# Patient Record
Sex: FEMALE | Race: WHITE | Employment: UNEMPLOYED | URBAN - METROPOLITAN AREA
[De-identification: names, ages, dates, MRNs, and addresses within clinical notes are randomized per-mention and may not be internally consistent; named-entity substitution may affect disease eponyms.]

---

## 2017-02-06 PROBLEM — F51.01 PRIMARY INSOMNIA: Status: ACTIVE | Noted: 2017-02-06

## 2017-02-06 PROBLEM — J30.1 SEASONAL ALLERGIC RHINITIS DUE TO POLLEN: Status: ACTIVE | Noted: 2017-02-06

## 2017-02-06 PROBLEM — M51.36 DDD (DEGENERATIVE DISC DISEASE), LUMBAR: Status: ACTIVE | Noted: 2017-02-06

## 2017-02-06 PROBLEM — G89.29 CHRONIC MIDLINE LOW BACK PAIN WITHOUT SCIATICA: Status: ACTIVE | Noted: 2017-02-06

## 2017-02-06 PROBLEM — G43.519 INTRACTABLE PERSISTENT MIGRAINE AURA WITHOUT CEREBRAL INFARCTION AND WITHOUT STATUS MIGRAINOSUS: Status: ACTIVE | Noted: 2017-02-06

## 2017-02-06 PROBLEM — K75.81 NASH (NONALCOHOLIC STEATOHEPATITIS): Status: ACTIVE | Noted: 2017-02-06

## 2017-02-06 PROBLEM — I10 ESSENTIAL HYPERTENSION: Status: ACTIVE | Noted: 2017-02-06

## 2017-02-06 PROBLEM — K21.9 GASTROESOPHAGEAL REFLUX DISEASE WITHOUT ESOPHAGITIS: Status: ACTIVE | Noted: 2017-02-06

## 2017-02-06 PROBLEM — K74.60 LIVER CIRRHOSIS SECONDARY TO NASH (NONALCOHOLIC STEATOHEPATITIS) (HCC): Status: ACTIVE | Noted: 2017-02-06

## 2017-02-06 PROBLEM — K75.81 LIVER CIRRHOSIS SECONDARY TO NASH (NONALCOHOLIC STEATOHEPATITIS) (HCC): Status: ACTIVE | Noted: 2017-02-06

## 2017-02-06 PROBLEM — J45.20 MILD INTERMITTENT ASTHMA WITHOUT COMPLICATION: Status: ACTIVE | Noted: 2017-02-06

## 2017-02-06 PROBLEM — M54.50 CHRONIC MIDLINE LOW BACK PAIN WITHOUT SCIATICA: Status: ACTIVE | Noted: 2017-02-06

## 2017-02-06 PROBLEM — K50.80 CROHN'S DISEASE OF BOTH SMALL AND LARGE INTESTINE WITHOUT COMPLICATION (HCC): Status: ACTIVE | Noted: 2017-02-06

## 2017-02-06 PROBLEM — F33.41 RECURRENT MAJOR DEPRESSIVE DISORDER, IN PARTIAL REMISSION (HCC): Status: ACTIVE | Noted: 2017-02-06

## 2017-02-06 PROBLEM — F41.9 ANXIETY: Status: ACTIVE | Noted: 2017-02-06

## 2018-10-22 PROBLEM — M54.41 CHRONIC MIDLINE LOW BACK PAIN WITH RIGHT-SIDED SCIATICA: Status: ACTIVE | Noted: 2017-02-06

## 2020-10-16 PROBLEM — Q52.4: Status: ACTIVE | Noted: 2017-03-27

## 2021-04-19 PROBLEM — M81.0 AGE-RELATED OSTEOPOROSIS WITHOUT CURRENT PATHOLOGICAL FRACTURE: Status: ACTIVE | Noted: 2021-04-19

## 2021-04-29 ENCOUNTER — TRANSCRIBE ORDER (OUTPATIENT)
Dept: SCHEDULING | Age: 61
End: 2021-04-29

## 2021-04-29 DIAGNOSIS — Z12.31 SCREENING MAMMOGRAM FOR HIGH-RISK PATIENT: Primary | ICD-10-CM

## 2021-11-11 PROBLEM — J01.00 ACUTE NON-RECURRENT MAXILLARY SINUSITIS: Status: ACTIVE | Noted: 2021-11-11

## 2021-11-11 PROBLEM — J01.00 ACUTE NON-RECURRENT MAXILLARY SINUSITIS: Status: RESOLVED | Noted: 2021-11-11 | Resolved: 2021-11-11

## 2021-12-10 PROBLEM — R25.1 TREMORS OF NERVOUS SYSTEM: Status: ACTIVE | Noted: 2021-12-10

## 2022-03-18 PROBLEM — K21.9 GASTROESOPHAGEAL REFLUX DISEASE WITHOUT ESOPHAGITIS: Status: ACTIVE | Noted: 2017-02-06

## 2022-03-18 PROBLEM — M81.0 AGE-RELATED OSTEOPOROSIS WITHOUT CURRENT PATHOLOGICAL FRACTURE: Status: ACTIVE | Noted: 2021-04-19

## 2022-03-18 PROBLEM — J45.20 MILD INTERMITTENT ASTHMA WITHOUT COMPLICATION: Status: ACTIVE | Noted: 2017-02-06

## 2022-03-18 PROBLEM — Q52.4: Status: ACTIVE | Noted: 2017-03-27

## 2022-03-19 PROBLEM — F41.9 ANXIETY: Status: ACTIVE | Noted: 2017-02-06

## 2022-03-19 PROBLEM — I10 ESSENTIAL HYPERTENSION: Status: ACTIVE | Noted: 2017-02-06

## 2022-03-19 PROBLEM — K74.60 LIVER CIRRHOSIS SECONDARY TO NASH (NONALCOHOLIC STEATOHEPATITIS) (HCC): Status: ACTIVE | Noted: 2017-02-06

## 2022-03-19 PROBLEM — K50.80 CROHN'S DISEASE OF BOTH SMALL AND LARGE INTESTINE WITHOUT COMPLICATION (HCC): Status: ACTIVE | Noted: 2017-02-06

## 2022-03-19 PROBLEM — K75.81 LIVER CIRRHOSIS SECONDARY TO NASH (NONALCOHOLIC STEATOHEPATITIS) (HCC): Status: ACTIVE | Noted: 2017-02-06

## 2022-03-19 PROBLEM — F51.01 PRIMARY INSOMNIA: Status: ACTIVE | Noted: 2017-02-06

## 2022-03-19 PROBLEM — M54.41 CHRONIC MIDLINE LOW BACK PAIN WITH RIGHT-SIDED SCIATICA: Status: ACTIVE | Noted: 2017-02-06

## 2022-03-19 PROBLEM — F33.41 RECURRENT MAJOR DEPRESSIVE DISORDER, IN PARTIAL REMISSION (HCC): Status: ACTIVE | Noted: 2017-02-06

## 2022-03-19 PROBLEM — G89.29 CHRONIC MIDLINE LOW BACK PAIN WITH RIGHT-SIDED SCIATICA: Status: ACTIVE | Noted: 2017-02-06

## 2022-03-19 PROBLEM — G43.519 INTRACTABLE PERSISTENT MIGRAINE AURA WITHOUT CEREBRAL INFARCTION AND WITHOUT STATUS MIGRAINOSUS: Status: ACTIVE | Noted: 2017-02-06

## 2022-03-19 PROBLEM — R25.1 TREMORS OF NERVOUS SYSTEM: Status: ACTIVE | Noted: 2021-12-10

## 2022-03-20 PROBLEM — M51.36 DDD (DEGENERATIVE DISC DISEASE), LUMBAR: Status: ACTIVE | Noted: 2017-02-06

## 2022-03-20 PROBLEM — J30.1 SEASONAL ALLERGIC RHINITIS DUE TO POLLEN: Status: ACTIVE | Noted: 2017-02-06

## 2022-05-16 PROBLEM — Z91.81 AT HIGH RISK FOR FALLS: Status: ACTIVE | Noted: 2022-05-16

## 2022-06-30 RX ORDER — POTASSIUM CHLORIDE 1500 MG/1
TABLET, FILM COATED, EXTENDED RELEASE ORAL
Qty: 90 TABLET | Refills: 1 | OUTPATIENT
Start: 2022-06-30

## 2022-07-01 ENCOUNTER — TELEMEDICINE (OUTPATIENT)
Dept: INTERNAL MEDICINE CLINIC | Facility: CLINIC | Age: 62
End: 2022-07-01
Payer: COMMERCIAL

## 2022-07-01 DIAGNOSIS — K21.9 GASTROESOPHAGEAL REFLUX DISEASE WITHOUT ESOPHAGITIS: ICD-10-CM

## 2022-07-01 DIAGNOSIS — G43.519 INTRACTABLE PERSISTENT MIGRAINE AURA WITHOUT CEREBRAL INFARCTION AND WITHOUT STATUS MIGRAINOSUS: ICD-10-CM

## 2022-07-01 DIAGNOSIS — F41.9 ANXIETY: ICD-10-CM

## 2022-07-01 DIAGNOSIS — M51.36 DDD (DEGENERATIVE DISC DISEASE), LUMBAR: Primary | ICD-10-CM

## 2022-07-01 DIAGNOSIS — F51.01 PRIMARY INSOMNIA: ICD-10-CM

## 2022-07-01 DIAGNOSIS — J00 ACUTE NASOPHARYNGITIS: ICD-10-CM

## 2022-07-01 PROCEDURE — 99214 OFFICE O/P EST MOD 30 MIN: CPT | Performed by: INTERNAL MEDICINE

## 2022-07-01 RX ORDER — SUMATRIPTAN 100 MG/1
100 TABLET, FILM COATED ORAL
Qty: 27 TABLET | Refills: 1 | Status: SHIPPED | OUTPATIENT
Start: 2022-07-01 | End: 2022-10-28 | Stop reason: SDUPTHER

## 2022-07-01 RX ORDER — ONDANSETRON 8 MG/1
8 TABLET, ORALLY DISINTEGRATING ORAL EVERY 8 HOURS PRN
Qty: 90 TABLET | Refills: 2 | Status: SHIPPED | OUTPATIENT
Start: 2022-07-01

## 2022-07-01 RX ORDER — AZITHROMYCIN 250 MG/1
250 TABLET, FILM COATED ORAL SEE ADMIN INSTRUCTIONS
Qty: 6 TABLET | Refills: 0 | Status: SHIPPED | OUTPATIENT
Start: 2022-07-01 | End: 2022-07-06

## 2022-07-01 RX ORDER — FLUCONAZOLE 150 MG/1
150 TABLET ORAL ONCE
Qty: 1 TABLET | Refills: 1 | Status: SHIPPED | OUTPATIENT
Start: 2022-07-01 | End: 2022-07-01

## 2022-07-01 RX ORDER — ALPRAZOLAM 2 MG/1
2 TABLET ORAL 3 TIMES DAILY PRN
Qty: 90 TABLET | Refills: 2 | Status: SHIPPED | OUTPATIENT
Start: 2022-07-01 | End: 2022-07-31

## 2022-07-01 RX ORDER — FLUOXETINE HYDROCHLORIDE 40 MG/1
40 CAPSULE ORAL 2 TIMES DAILY
Qty: 180 CAPSULE | Refills: 1 | Status: SHIPPED | OUTPATIENT
Start: 2022-07-01

## 2022-07-01 RX ORDER — POTASSIUM CHLORIDE 20 MEQ/1
20 TABLET, EXTENDED RELEASE ORAL DAILY
Qty: 90 TABLET | Refills: 1 | Status: SHIPPED | OUTPATIENT
Start: 2022-07-01 | End: 2022-09-09 | Stop reason: SDUPTHER

## 2022-07-01 RX ORDER — TRAZODONE HYDROCHLORIDE 50 MG/1
100 TABLET ORAL NIGHTLY
Qty: 60 TABLET | Refills: 5 | Status: SHIPPED | OUTPATIENT
Start: 2022-07-01

## 2022-07-01 RX ORDER — CELECOXIB 200 MG/1
200 CAPSULE ORAL DAILY
Qty: 90 CAPSULE | Refills: 1 | Status: SHIPPED | OUTPATIENT
Start: 2022-07-01

## 2022-07-01 RX ORDER — PREGABALIN 150 MG/1
150 CAPSULE ORAL 2 TIMES DAILY
Qty: 60 CAPSULE | Refills: 2 | Status: SHIPPED | OUTPATIENT
Start: 2022-07-01

## 2022-07-01 ASSESSMENT — ENCOUNTER SYMPTOMS: RESPIRATORY NEGATIVE: 1

## 2022-07-01 NOTE — PROGRESS NOTES
[facility]      [facility]    Patient Name: Noah Buchanan  :  1960    Subjective:    Chief Complaint:  Chief Complaint   Patient presents with    Follow-up         HPI I was at home while conducting this encounter. Consent:  She and/or her healthcare decision maker is aware that this patient-initiated Telehealth encounter is a billable service, with coverage as determined by her insurance carrier. She is aware that she may receive a bill and has provided verbal consent to proceed: Yes    This virtual visit was conducted via [address] Pursuant to the emergency declaration under the [facility], [facility] waiver authority and the [employer] and [employer] Act, this Virtual  Visit was conducted to reduce the patient's risk of exposure to COVID-19 and provide continuity of care for an established patient. Services were provided through a video synchronous discussion virtually to substitute for in-person clinic visit. Due to this being a TeleHealth evaluation, many elements of the physical examination are unable to be assessed. Total Time: minutes: 21-30 minutes. Patient evaluated for f/u; she has history of lumbar DDD, chronic back pain; she had spinal injection 4 days ago at Dr. Khurram Mora office, Pain Management, with minimal relief; she is also prescribed Oxycodone and muscle relaxants; she is no longer taking Fentanyl; she has f/u next week; She has anxiety and insomnia, feeling well on present medication and needs refills;    She is concerned she may have another sinus infection, has had more sinus congestion, drainage;     Past Medical History:   Diagnosis Date    Anxiety     [facility] disease ([facility] [address]     Depression     H/O seasonal allergies     History of bone density study 2018    osteoporosis    History of mammogram 2018    HTN (hypertension)     Insomnia     Liver cirrhosis secondary to CASTELLANOS (nonalcoholic steatohepatitis) [facility])        Past Surgical History:   Procedure Laterality Date    APPENDECTOMY      BACK SURGERY      CHOLECYSTECTOMY      COLONOSCOPY  2017    Dr. Lee William      DEEP AND BSO (CERVIX REMOVED)      TUBAL LIGATION         Family History   Problem Relation Age of Onset    Breast Cancer Mother        Social History     Tobacco Use    Smoking status: Former Smoker     Packs/day: 2.00     Quit date: 1997     Years since quittin.5    Smokeless tobacco: Never Used   Substance Use Topics    Alcohol use: No    Drug use: No                 Current Outpatient Medications:     ALPRAZolam (XANAX) 2 MG tablet, Take 1 tablet by mouth 3 times daily as needed for Anxiety for up to 30 days. , Disp: 90 tablet, Rfl: 2    traZODone (DESYREL) 50 MG tablet, Take 2 tablets by mouth nightly, Disp: 60 tablet, Rfl: 5    ondansetron (ZOFRAN-ODT) 8 MG TBDP disintegrating tablet, Place 1 tablet under the tongue every 8 hours as needed for Nausea or Vomiting TAKE 1 TAB BY MOUTH EVERY EIGHT (8) HOURS AS NEEDED FOR NAUSEA, Disp: 90 tablet, Rfl: 2    celecoxib (CELEBREX) 200 MG capsule, Take 1 capsule by mouth daily TAKE 1 CAP BY MOUTH DAILY FOR 90 DAYS., Disp: 90 capsule, Rfl: 1    pregabalin (LYRICA) 150 MG capsule, Take 1 capsule by mouth 2 times daily for 30 days. , Disp: 60 capsule, Rfl: 2    potassium chloride (KLOR-CON M) 20 MEQ extended release tablet, Take 1 tablet by mouth daily TAKE 1 TABLET (20 MEQ TOTAL) BY MOUTH DAILY. , Disp: 90 tablet, Rfl: 1    FLUoxetine (PROZAC) 40 MG capsule, Take 1 capsule by mouth 2 times daily, Disp: 180 capsule, Rfl: 1    azithromycin (ZITHROMAX) 250 MG tablet, Take 1 tablet by mouth See Admin Instructions for 5 days 500mg on day 1 followed by 250mg on days 2 - 5, Disp: 6 tablet, Rfl: 0    fluconazole (DIFLUCAN) 150 MG tablet, Take 1 tablet by mouth once for 1 dose, Disp: 1 tablet, Rfl: 1    SUMAtriptan (IMITREX) mouth daily. , Disp: , Rfl:     fluticasone (FLONASE) 50 MCG/ACT nasal spray, 1 spray by Each Nare route 2 (two) times a day., Disp: , Rfl:     ipratropium-albuterol (DUONEB) 0.5-2.5 (3) MG/3ML SOLN nebulizer solution, Take 3 mL by nebulization every 6 (six) hours as needed for wheezing., Disp: , Rfl:     levocetirizine (XYZAL) 5 MG tablet, Take 5 mg by mouth daily as needed, Disp: , Rfl:     lidocaine (XYLOCAINE) 5 % ointment, APPLY 2-3 GRAMS TO AFFECTED AREA(S) 3-4 TIMES DAILY. (1 GRAM = 1 DIME SIZE AMOUNT) AS NEEDED, Disp: , Rfl:     loratadine (CLARITIN) 10 MG tablet, TAKE 1 TABLET BY MOUTH EVERY DAY, Disp: , Rfl:     montelukast (SINGULAIR) 10 MG tablet, TAKE 1 TABLET BY MOUTH EVERY DAY, Disp: , Rfl:     ofloxacin (FLOXIN) 0.3 % otic solution, Place 5 drops in ear(s) 2 times daily, Disp: , Rfl:     oxyCODONE HCl (OXY-IR) 10 MG immediate release tablet, TAKE 1 TAB BY MOUTH EVERY 6 HOURS, Disp: , Rfl:     oxyCODONE-acetaminophen (PERCOCET) 5-325 MG per tablet, TAKE 1 TO 2 TABLETS BY MOUTH EVERY 8 HOURS AS NEEDED FOR PAIN, Disp: , Rfl:     pantoprazole (PROTONIX) 40 MG tablet, Take 40 mg by mouth 2 times daily, Disp: , Rfl:     propranolol (INDERAL) 10 MG tablet, TAKE 1 TAB BY MOUTH THREE (3) TIMES DAILY FOR 90 DAYS., Disp: , Rfl:     brompheniramine-pseudoephedrine-DM 2-30-10 MG/5ML syrup, Take 5 mLs by mouth 4 times daily as needed, Disp: , Rfl:     triamcinolone acetonide (KENALOG) 0.1 % paste, Apply 0.25 inches to teeth 2 (two) times a day., Disp: , Rfl:     Allergies   Allergen Reactions    Latex Other (See Comments)     02 TUBING TUBING FROM PAIN PUMP    Penicillin G Hives, Other (See Comments) and Rash     WHEEZING    Codeine Itching and Rash     Other reaction(s): Abdominal pain-Intolerance  Due to nausea, has taken since then & did fine, no side effects    Morphine Itching    Adhesive Tape Hives     ADHESIVE TAPE  BLISTERS  ADHESIVE TAPE  BLISTERS      Hydrocortisone Other (See Comments) See Admin Instructions for 5 days 500mg on day 1 followed by 250mg on days 2 - 5  -     fluconazole (DIFLUCAN) 150 MG tablet; Take 1 tablet by mouth once for 1 dose    Intractable persistent migraine aura without cerebral infarction and without status migrainosus  Stable on present medications, continue as prescribed      -     SUMAtriptan (IMITREX) 100 MG tablet; Take 1 tablet by mouth once as needed for Migraine          Follow-up and Dispositions    · Return in about 6 months (around 1/1/2023), or if symptoms worsen or fail to improve, for f/u w/ labs. Medication Reconciliation:  Current Medications Verified: Current medications/ immunizations were reviewed, including purpose, with patient. Family History, Social History, Current and Past Medical History was reviewed with patient and updated at today's office visit. Medication Reconciliation list was given to patient/ family. Patient was advised to discard old medication lists and provide all providers with current list at each visit and carry list with them in case of emergency.     Completed By:   Katie Rogers MD    [facility]  [address] [place] [address], [facility] [place]  [place], [address] [place] [address] Rd  [phone]  [phone] fax  2:35 PM

## 2022-07-11 DIAGNOSIS — Z76.0 ENCOUNTER FOR ISSUE OF REPEAT PRESCRIPTION: ICD-10-CM

## 2022-07-11 RX ORDER — PROPRANOLOL HYDROCHLORIDE 10 MG/1
TABLET ORAL
Qty: 270 TABLET | Refills: 1 | OUTPATIENT
Start: 2022-07-11

## 2022-07-15 DIAGNOSIS — F33.41 RECURRENT MAJOR DEPRESSIVE DISORDER, IN PARTIAL REMISSION (HCC): Primary | ICD-10-CM

## 2022-07-15 DIAGNOSIS — F51.01 PRIMARY INSOMNIA: ICD-10-CM

## 2022-07-15 DIAGNOSIS — K50.80 CROHN'S DISEASE OF BOTH SMALL AND LARGE INTESTINE WITHOUT COMPLICATION (HCC): ICD-10-CM

## 2022-07-15 RX ORDER — PROPRANOLOL HYDROCHLORIDE 10 MG/1
TABLET ORAL
Qty: 90 TABLET | Status: CANCELLED | OUTPATIENT
Start: 2022-07-15

## 2022-07-15 RX ORDER — TRAZODONE HYDROCHLORIDE 50 MG/1
100 TABLET ORAL NIGHTLY
Qty: 60 TABLET | Refills: 5 | Status: CANCELLED | OUTPATIENT
Start: 2022-07-15 | End: 2022-08-14

## 2022-07-15 RX ORDER — DICYCLOMINE HYDROCHLORIDE 10 MG/1
10 CAPSULE ORAL 2 TIMES DAILY
Qty: 120 CAPSULE | Status: CANCELLED | OUTPATIENT
Start: 2022-07-15

## 2022-07-15 RX ORDER — AZATHIOPRINE 50 MG/1
TABLET ORAL
Qty: 30 TABLET | OUTPATIENT
Start: 2022-07-15

## 2022-07-15 RX ORDER — DULOXETIN HYDROCHLORIDE 20 MG/1
20 CAPSULE, DELAYED RELEASE ORAL DAILY
Qty: 30 CAPSULE | Refills: 2 | Status: CANCELLED | OUTPATIENT
Start: 2022-07-15 | End: 2022-10-13

## 2022-07-21 ENCOUNTER — COMMUNITY OUTREACH (OUTPATIENT)
Dept: INTERNAL MEDICINE CLINIC | Facility: CLINIC | Age: 62
End: 2022-07-21

## 2022-07-21 NOTE — PROGRESS NOTES
Patient's HM shows they are overdue for Colonoscopy. [employer] and  files searched with  success.   Results attached to order and HM updated

## 2022-07-25 ENCOUNTER — COMMUNITY OUTREACH (OUTPATIENT)
Dept: INTERNAL MEDICINE CLINIC | Facility: CLINIC | Age: 62
End: 2022-07-25

## 2022-07-25 NOTE — PROGRESS NOTES
Patient's HM shows they are overdue for mammogram.   [employer] and  files searched  without success.

## 2022-07-26 ENCOUNTER — TELEPHONE (OUTPATIENT)
Dept: INTERNAL MEDICINE CLINIC | Facility: CLINIC | Age: 62
End: 2022-07-26

## 2022-08-01 ENCOUNTER — OFFICE VISIT (OUTPATIENT)
Dept: NEUROSURGERY | Age: 62
End: 2022-08-01
Payer: COMMERCIAL

## 2022-08-01 VITALS
SYSTOLIC BLOOD PRESSURE: 117 MMHG | TEMPERATURE: 97.3 F | HEART RATE: 59 BPM | BODY MASS INDEX: 26.31 KG/M2 | HEIGHT: 62 IN | WEIGHT: 143 LBS | OXYGEN SATURATION: 99 % | DIASTOLIC BLOOD PRESSURE: 72 MMHG

## 2022-08-01 DIAGNOSIS — G89.4 CHRONIC PAIN SYNDROME: Primary | ICD-10-CM

## 2022-08-01 PROCEDURE — 99214 OFFICE O/P EST MOD 30 MIN: CPT | Performed by: NURSE PRACTITIONER

## 2022-08-01 ASSESSMENT — PATIENT HEALTH QUESTIONNAIRE - PHQ9
SUM OF ALL RESPONSES TO PHQ QUESTIONS 1-9: 0
SUM OF ALL RESPONSES TO PHQ9 QUESTIONS 1 & 2: 0
1. LITTLE INTEREST OR PLEASURE IN DOING THINGS: 0
SUM OF ALL RESPONSES TO PHQ QUESTIONS 1-9: 0
2. FEELING DOWN, DEPRESSED OR HOPELESS: 0
SUM OF ALL RESPONSES TO PHQ QUESTIONS 1-9: 0
1. LITTLE INTEREST OR PLEASURE IN DOING THINGS: 0
SUM OF ALL RESPONSES TO PHQ QUESTIONS 1-9: 0
SUM OF ALL RESPONSES TO PHQ9 QUESTIONS 1 & 2: 0
SUM OF ALL RESPONSES TO PHQ QUESTIONS 1-9: 0
2. FEELING DOWN, DEPRESSED OR HOPELESS: 0

## 2022-08-01 NOTE — PROGRESS NOTES
[facility] NOTE:   History of Present Illness:    CC: [facility] follow up    Abril Salomon is a 63 y.o. female here to follow-up after having a right L4-5 selective nerve root block with Dr. German Villalba. Patient states the selective nerve root block did not alleviate any of her pain but in fact intensified it. Patient states that she is in unbearable pain and that the medication that she has been given is not enough. Patient states that she is interested in getting restarted on the fentanyl patches that she was once being prescribed. Past Medical History:   Diagnosis Date    Anxiety     Crohn's disease ([facility] [address]     Depression     H/O seasonal allergies     History of bone density study 03/2018    osteoporosis    History of mammogram 03/2018    HTN (hypertension)     Insomnia     Liver cirrhosis secondary to CASTELLANOS (nonalcoholic steatohepatitis) ([facility] [address]       Past Surgical History:   Procedure Laterality Date    APPENDECTOMY      BACK SURGERY  2004    CHOLECYSTECTOMY      COLONOSCOPY  01/2017    Dr. Osmany Taylro  2012    DEEP AND BSO (CERVIX REMOVED)      TUBAL LIGATION       Allergies   Allergen Reactions    Latex Other (See Comments)     02 TUBING TUBING FROM PAIN PUMP    Penicillin G Hives, Other (See Comments) and Rash     WHEEZING    Codeine Itching and Rash     Other reaction(s): Abdominal pain-Intolerance  Due to nausea, has taken since then & did fine, no side effects    Morphine Itching    Adhesive Tape Hives     ADHESIVE TAPE  BLISTERS        Hydrocortisone Other (See Comments)     IV infusion infiltrated and caused blisters.       Family History   Problem Relation Age of Onset    Breast Cancer Mother       Social History     Socioeconomic History    Marital status:      Spouse name: Not on file    Number of children: Not on file    Years of education: Not on file    Highest education level: Not on file   Occupational History    Not on file   Tobacco Use Smoking status: Former     Packs/day: 2.00     Types: Cigarettes     Quit date: 1997     Years since quittin.5    Smokeless tobacco: Never   Substance and Sexual Activity    Alcohol use: No    Drug use: No    Sexual activity: Not on file   Other Topics Concern    Not on file   Social History Narrative    Not on file     Social Determinants of Health     Financial Resource Strain: Not on file   Food Insecurity: Not on file   Transportation Needs: Not on file   Physical Activity: Not on file   Stress: Not on file   Social Connections: Not on file   Intimate Partner Violence: Not on file   Housing Stability: Not on file     Current Outpatient Medications   Medication Sig Dispense Refill    ondansetron (ZOFRAN-ODT) 8 MG TBDP disintegrating tablet Place 1 tablet under the tongue every 8 hours as needed for Nausea or Vomiting TAKE 1 TAB BY MOUTH EVERY EIGHT (8) HOURS AS NEEDED FOR NAUSEA 90 tablet 2    celecoxib (CELEBREX) 200 MG capsule Take 1 capsule by mouth daily TAKE 1 CAP BY MOUTH DAILY FOR 90 DAYS. 90 capsule 1    potassium chloride (KLOR-CON M) 20 MEQ extended release tablet Take 1 tablet by mouth daily TAKE 1 TABLET (20 MEQ TOTAL) BY MOUTH DAILY. 90 tablet 1    FLUoxetine (PROZAC) 40 MG capsule Take 1 capsule by mouth 2 times daily 180 capsule 1    FERROUS SULFATE PO Take 1 tablet by mouth daily. OMEGA-3 FATTY ACIDS-VITAMIN E PO Take by mouth      albuterol sulfate  (90 Base) MCG/ACT inhaler INHALE 2 PUFFS EVERY 6 (SIX) HOURS AS NEEDED FOR WHEEZING. ascorbic acid (VITAMIN C) 500 MG tablet Take 1 tablet (500 mg) by mouth daily. azaTHIOprine (IMURAN) 50 MG tablet TAKE 1 TABLET BY MOUTH 2 TIMES A DAY. Budesonide ER 9 MG TB24 TAKE 1 TABLET BY MOUTH ONCE DAILY.       buPROPion (WELLBUTRIN) 100 MG tablet Take 100 mg by mouth 2 times daily      calcipotriene (DOVONEX) 0.005 % cream APPLY 2-3 GRAMS TO AFFECTED AREA(S) 3-4 TIMES DAILY. (1 GRAM = 1 DIME SIZE AMOUNT) AS NEEDED Calcium-Vitamin D-Vitamin K 500-100-40 MG-UNT-MCG CHEW 500 mg.      vitamin D (CHOLECALCIFEROL) 125 MCG (5000 UT) CAPS capsule TAKE ONE CAPSULE BY MOUTH EVERY DAY      cyanocobalamin 1000 MCG/ML injection Inject 1 mL (1,000 mcg total) into the shoulder, thigh, or buttocks every 30 (thirty) days. Inject as directed: Dispense 1 multidose vial: Dispense injection syringes with medication. 12 each 21g needle with syringe. diclofenac sodium (VOLTAREN) 1 % GEL APPLY 3 4 GRAMS TO AFFECTED AREA THREE TIMES DAILY AS NEEDED      dicyclomine (BENTYL) 10 MG capsule Take 10 mg by mouth 2 times daily      diphenhydrAMINE (SOMINEX) 25 MG tablet Take 50 mg by mouth      doxycycline monohydrate (MONODOX) 100 MG capsule Take 100 mg by mouth 2 times daily      ergocalciferol (ERGOCALCIFEROL) 1.25 MG (22575 UT) capsule Take 1 capsule (50,000 Units total) by mouth daily. fluticasone (FLONASE) 50 MCG/ACT nasal spray 1 spray by Each Nare route 2 (two) times a day. ipratropium-albuterol (DUONEB) 0.5-2.5 (3) MG/3ML SOLN nebulizer solution Take 3 mL by nebulization every 6 (six) hours as needed for wheezing. levocetirizine (XYZAL) 5 MG tablet Take 5 mg by mouth daily as needed      lidocaine (XYLOCAINE) 5 % ointment APPLY 2-3 GRAMS TO AFFECTED AREA(S) 3-4 TIMES DAILY. (1 GRAM = 1 DIME SIZE AMOUNT) AS NEEDED      loratadine (CLARITIN) 10 MG tablet TAKE 1 TABLET BY MOUTH EVERY DAY      montelukast (SINGULAIR) 10 MG tablet TAKE 1 TABLET BY MOUTH EVERY DAY      ofloxacin (FLOXIN) 0.3 % otic solution Place 5 drops in ear(s) 2 times daily      oxyCODONE HCl (OXY-IR) 10 MG immediate release tablet TAKE 1 TAB BY MOUTH EVERY 6 HOURS      oxyCODONE-acetaminophen (PERCOCET) 5-325 MG per tablet TAKE 1 TO 2 TABLETS BY MOUTH EVERY 8 HOURS AS NEEDED FOR PAIN      pantoprazole (PROTONIX) 40 MG tablet Take 40 mg by mouth 2 times daily      propranolol (INDERAL) 10 MG tablet TAKE 1 TAB BY MOUTH THREE (3) TIMES DAILY FOR 90 DAYS. brompheniramine-pseudoephedrine-DM 2-30-10 MG/5ML syrup Take 5 mLs by mouth 4 times daily as needed      triamcinolone acetonide (KENALOG) 0.1 % paste Apply 0.25 inches to teeth 2 (two) times a day. traZODone (DESYREL) 50 MG tablet Take 2 tablets by mouth nightly 60 tablet 5    pregabalin (LYRICA) 150 MG capsule Take 1 capsule by mouth 2 times daily for 30 days. 60 capsule 2    SUMAtriptan (IMITREX) 100 MG tablet Take 1 tablet by mouth once as needed for Migraine 27 tablet 1    DULoxetine (CYMBALTA) 20 MG extended release capsule Take 20 mg by mouth daily       No current facility-administered medications for this visit. Patient Active Problem List   Diagnosis    Congenital anomaly of vagina    Dupuytren's disease of palm    Mild intermittent asthma without complication    Age-related osteoporosis without current pathological fracture    Gastroesophageal reflux disease without esophagitis    Vitamin D deficiency    Crohn's disease of both small and large intestine without complication (HCC)    Recurrent major depressive disorder, in partial remission (HCC)    Elbow pain    Tremors of nervous system    Intractable persistent migraine aura without cerebral infarction and without status migrainosus    Liver cirrhosis secondary to CASTELLANOS (nonalcoholic steatohepatitis) (HCC)    Essential hypertension    Anxiety    Primary insomnia    Chronic midline low back pain with right-sided sciatica    DDD (degenerative disc disease), lumbar    Seasonal allergic rhinitis due to pollen    At high risk for falls          ROS Review of Systems    Constitutional:                    No recent weight changes, fever, fatigue, sleep difficulties, loss of appetite   ENT/Mouth:  No hearing loss, No ringing in the ears, chronic sinus problem, nose bleeds,sore throat, voice change, hoarseness, swollen glands in neck, or difficulties with chewing and swallowing.    Cardiovascular:  No chest pain/angina pectoris, palpitations, swelling of feet/ankles/hands, or calf pain while walking. Respiratory: No chronic or frequent coughs, spitting up blood, shortness of breath, No asthma, or wheezing. Gastrointestinal: No a bdominal pain, heartburn, nausea, vomiting, constipation, or frequent diarrhea     Genitourinary: No frequent urination, burning or painful urination, or blood in urine     Musculoskeletal:   POS back pain     Integument:   No rash/itching     Neurological:   Dizziness/vertigo, No numbness/tingling sensation, tremors, No weakness in limbs, frequent or recurring headaches, memory loss or confusion. Physical Exam:    General: No acute distress  Head normocephalic and atraumatic  Mood and affect appropriate  CV: Regular rate   Resp: No increased work of breathing  Skin: warm and dry   Awake, alert, and oriented   Speech fluent  Eyes open spontaneously   Face symmetric and tongue midline on protrusion  Sternocleidomastoid and trapezius 5/5  No mid-line cervical, thoracic, or lumbar tenderness to palpation   Patient with strength exam as follows:   Upper Extremities: Right Left      Deltoid  5 5    Biceps  5 5    Triceps  5 5      5 5   Hand Intrinsics  5 5  Wrist flexors/extensors  5 5     Lower Extremities:      Hip Flex 5 5    Quads  5 5    Hamstrings 5 5    Dorsiflex 5 5    Plantarflex 5 5    EHL  5 5  Sensation intact to light touch and pin-prick   DTR 2+  No clonus or babinski present   No Quintero's sign present bilaterally   Gait normal    Assessment & Plan:  Calista Ford is a 63 y.o. female who presents to follow-up after having a right L4-5 selective nerve root block. At this time the patient states that she had no relief after her selective nerve root block but actually had worsening in pain. I am unable to find a structural origin to the patient's current pain. Patient is to follow back up with pain management to discuss other options. No diagnosis found.     Notes are transcribed with [employer], a medical voice

## 2022-08-03 DIAGNOSIS — F41.9 ANXIETY: ICD-10-CM

## 2022-08-03 DIAGNOSIS — F41.9 ANXIETY: Primary | ICD-10-CM

## 2022-08-03 RX ORDER — ESTRADIOL 0.1 MG/G
CREAM VAGINAL
COMMUNITY
Start: 2022-07-11

## 2022-08-03 RX ORDER — ALPRAZOLAM 2 MG/1
2 TABLET ORAL 3 TIMES DAILY PRN
Qty: 90 TABLET | Refills: 2 | Status: SHIPPED | OUTPATIENT
Start: 2022-08-03 | End: 2022-08-04 | Stop reason: SDUPTHER

## 2022-08-03 RX ORDER — ALPRAZOLAM 2 MG/1
2 TABLET ORAL 3 TIMES DAILY PRN
COMMUNITY
End: 2022-08-03 | Stop reason: SDUPTHER

## 2022-08-03 RX ORDER — METHOCARBAMOL 500 MG/1
TABLET, FILM COATED ORAL
COMMUNITY
Start: 2022-07-11

## 2022-08-03 NOTE — TELEPHONE ENCOUNTER
Dr. Chavez Oh pt requesting Xanax refills, please. Scripts last fill date: 7/11/22 for a 30 day supply. Pended for pick-up on 8/10/22.

## 2022-08-04 RX ORDER — ALPRAZOLAM 2 MG/1
2 TABLET ORAL 3 TIMES DAILY PRN
Qty: 90 TABLET | Refills: 2 | Status: SHIPPED | OUTPATIENT
Start: 2022-08-10 | End: 2022-10-05 | Stop reason: SDUPTHER

## 2022-08-08 NOTE — TELEPHONE ENCOUNTER
Dr. Sisi Flores pt requesting Xanax refills, please. Scripts last fill date: 7/11/22 for a 30 day supply. Pended for pick-up on 8/10/22.

## 2022-08-15 ENCOUNTER — OFFICE VISIT (OUTPATIENT)
Dept: INTERNAL MEDICINE CLINIC | Facility: CLINIC | Age: 62
End: 2022-08-15
Payer: COMMERCIAL

## 2022-08-15 VITALS
BODY MASS INDEX: 26.5 KG/M2 | SYSTOLIC BLOOD PRESSURE: 139 MMHG | WEIGHT: 144 LBS | RESPIRATION RATE: 16 BRPM | TEMPERATURE: 98.4 F | DIASTOLIC BLOOD PRESSURE: 47 MMHG | HEIGHT: 62 IN | HEART RATE: 67 BPM | OXYGEN SATURATION: 97 %

## 2022-08-15 DIAGNOSIS — J00 ACUTE NASOPHARYNGITIS: ICD-10-CM

## 2022-08-15 DIAGNOSIS — F41.9 ANXIETY: ICD-10-CM

## 2022-08-15 DIAGNOSIS — G89.29 CHRONIC MIDLINE LOW BACK PAIN WITH RIGHT-SIDED SCIATICA: Primary | ICD-10-CM

## 2022-08-15 DIAGNOSIS — M54.41 CHRONIC MIDLINE LOW BACK PAIN WITH RIGHT-SIDED SCIATICA: Primary | ICD-10-CM

## 2022-08-15 DIAGNOSIS — I10 ESSENTIAL HYPERTENSION: ICD-10-CM

## 2022-08-15 DIAGNOSIS — J30.1 SEASONAL ALLERGIC RHINITIS DUE TO POLLEN: ICD-10-CM

## 2022-08-15 DIAGNOSIS — E55.9 VITAMIN D DEFICIENCY: ICD-10-CM

## 2022-08-15 DIAGNOSIS — Z23 PNEUMOCOCCAL VACCINATION ADMINISTERED AT CURRENT VISIT: ICD-10-CM

## 2022-08-15 DIAGNOSIS — M51.36 DDD (DEGENERATIVE DISC DISEASE), LUMBAR: ICD-10-CM

## 2022-08-15 DIAGNOSIS — R79.9 ABNORMAL BLOOD CHEMISTRY: ICD-10-CM

## 2022-08-15 PROCEDURE — 90471 IMMUNIZATION ADMIN: CPT | Performed by: INTERNAL MEDICINE

## 2022-08-15 PROCEDURE — 96372 THER/PROPH/DIAG INJ SC/IM: CPT | Performed by: INTERNAL MEDICINE

## 2022-08-15 PROCEDURE — 99214 OFFICE O/P EST MOD 30 MIN: CPT | Performed by: INTERNAL MEDICINE

## 2022-08-15 PROCEDURE — 90677 PCV20 VACCINE IM: CPT | Performed by: INTERNAL MEDICINE

## 2022-08-15 RX ORDER — BETAMETHASONE SODIUM PHOSPHATE AND BETAMETHASONE ACETATE 3; 3 MG/ML; MG/ML
6 INJECTION, SUSPENSION INTRA-ARTICULAR; INTRALESIONAL; INTRAMUSCULAR; SOFT TISSUE ONCE
Status: COMPLETED | OUTPATIENT
Start: 2022-08-15 | End: 2022-08-15

## 2022-08-15 RX ORDER — DULOXETIN HYDROCHLORIDE 20 MG/1
20 CAPSULE, DELAYED RELEASE ORAL DAILY
Qty: 90 CAPSULE | Refills: 1 | Status: SHIPPED | OUTPATIENT
Start: 2022-08-15 | End: 2022-09-09 | Stop reason: SDUPTHER

## 2022-08-15 RX ORDER — LEVOFLOXACIN 500 MG/1
500 TABLET, FILM COATED ORAL DAILY
Qty: 7 TABLET | Refills: 0 | Status: SHIPPED | OUTPATIENT
Start: 2022-08-15 | End: 2022-08-22

## 2022-08-15 RX ADMIN — BETAMETHASONE SODIUM PHOSPHATE AND BETAMETHASONE ACETATE 6 MG: 3; 3 INJECTION, SUSPENSION INTRA-ARTICULAR; INTRALESIONAL; INTRAMUSCULAR; SOFT TISSUE at 17:36

## 2022-08-15 ASSESSMENT — PATIENT HEALTH QUESTIONNAIRE - PHQ9
SUM OF ALL RESPONSES TO PHQ QUESTIONS 1-9: 2
2. FEELING DOWN, DEPRESSED OR HOPELESS: 1
SUM OF ALL RESPONSES TO PHQ9 QUESTIONS 1 & 2: 2
SUM OF ALL RESPONSES TO PHQ QUESTIONS 1-9: 2
1. LITTLE INTEREST OR PLEASURE IN DOING THINGS: 1

## 2022-08-15 ASSESSMENT — ENCOUNTER SYMPTOMS
VOMITING: 0
BACK PAIN: 1
SHORTNESS OF BREATH: 0
COUGH: 0
NAUSEA: 0
CHEST TIGHTNESS: 0
BLOOD IN STOOL: 0
ABDOMINAL PAIN: 0
SINUS PRESSURE: 1
RHINORRHEA: 1
DIARRHEA: 0

## 2022-08-15 NOTE — PROGRESS NOTES
[facility]      8/15/2022    Patient Name: Trisha Narvaez  :  1960    Subjective:    Chief Complaint:  Chief Complaint   Patient presents with    Skin Problem     Pt c/o possible skin cancer on face. HPI she is having a hard time; she thinks her  is having an affair; she has chronic pain, has been going to Pain Management, but wondering if she can see someone in [place];   C/o crusting lesions on her forehead that come and go;   C/o sinus congestion, drainage; she denies fever, has had some chills; no known sick contacts; she has seasonal allergies; She has anxiety, taking Cymbalta as prescribed and feeling well, needs a refill as prescribed; She is requesting Journey perfect sleep chair, states Medicare will pay for it;     Past Medical History:   Diagnosis Date    Anxiety     Crohn's disease ([facility] [address]     Depression     H/O seasonal allergies     History of bone density study 2018    osteoporosis    History of mammogram 2018    HTN (hypertension)     Insomnia     Liver cirrhosis secondary to CASTELLANOS (nonalcoholic steatohepatitis) [facility])        Past Surgical History:   Procedure Laterality Date    APPENDECTOMY      BACK SURGERY      CHOLECYSTECTOMY      COLONOSCOPY  2017    Dr. Eugene Coley  2012    DEEP AND BSO (CERVIX REMOVED)      TUBAL LIGATION         Family History   Problem Relation Age of Onset    Breast Cancer Mother        Social History     Tobacco Use    Smoking status: Former     Packs/day: 2.00     Types: Cigarettes     Quit date: 1997     Years since quittin.6    Smokeless tobacco: Never   Substance Use Topics    Alcohol use: No    Drug use: No                 Current Outpatient Medications:     levoFLOXacin (LEVAQUIN) 500 MG tablet, Take 1 tablet by mouth in the morning for 7 days. , Disp: 7 tablet, Rfl: 0    DULoxetine (CYMBALTA) 20 MG extended release capsule, Take 1 capsule by mouth in the morning., Disp: 90 capsule, Rfl: 1    [facility]. Devices KIT, Journey perfect sleep chair, chronic back pain, Disp: 1 kit, Rfl: 0    ALPRAZolam (XANAX) 2 MG tablet, Take 1 tablet by mouth 3 times daily as needed for Anxiety for up to 90 days. , Disp: 90 tablet, Rfl: 2    methocarbamol (ROBAXIN) 500 MG tablet, TAKE 1/2 TO 1 TAB 3 TIMES DAILY AS NEEDED FOR PAIN, Disp: , Rfl:     estradiol (ESTRACE) 0.1 MG/GM vaginal cream, APPLY HALF A GRAM INTO THE VAGINA NIGHTLY FOR 2 WEEKS THEN TWICE WEEK, Disp: , Rfl:     traZODone (DESYREL) 50 MG tablet, Take 2 tablets by mouth nightly, Disp: 60 tablet, Rfl: 5    ondansetron (ZOFRAN-ODT) 8 MG TBDP disintegrating tablet, Place 1 tablet under the tongue every 8 hours as needed for Nausea or Vomiting TAKE 1 TAB BY MOUTH EVERY EIGHT (8) HOURS AS NEEDED FOR NAUSEA, Disp: 90 tablet, Rfl: 2    celecoxib (CELEBREX) 200 MG capsule, Take 1 capsule by mouth daily TAKE 1 CAP BY MOUTH DAILY FOR 90 DAYS., Disp: 90 capsule, Rfl: 1    pregabalin (LYRICA) 150 MG capsule, Take 1 capsule by mouth 2 times daily for 30 days. , Disp: 60 capsule, Rfl: 2    potassium chloride (KLOR-CON M) 20 MEQ extended release tablet, Take 1 tablet by mouth daily TAKE 1 TABLET (20 MEQ TOTAL) BY MOUTH DAILY. , Disp: 90 tablet, Rfl: 1    FLUoxetine (PROZAC) 40 MG capsule, Take 1 capsule by mouth 2 times daily, Disp: 180 capsule, Rfl: 1    SUMAtriptan (IMITREX) 100 MG tablet, Take 1 tablet by mouth once as needed for Migraine, Disp: 27 tablet, Rfl: 1    FERROUS SULFATE PO, Take 1 tablet by mouth daily. , Disp: , Rfl:     OMEGA-3 FATTY ACIDS-VITAMIN E PO, Take by mouth, Disp: , Rfl:     albuterol sulfate  (90 Base) MCG/ACT inhaler, INHALE 2 PUFFS EVERY 6 (SIX) HOURS AS NEEDED FOR WHEEZING., Disp: , Rfl:     ascorbic acid (VITAMIN C) 500 MG tablet, Take 1 tablet (500 mg) by mouth daily. , Disp: , Rfl:     azaTHIOprine (IMURAN) 50 MG tablet, TAKE 1 TABLET BY MOUTH 2 TIMES A DAY., Disp: , Rfl:     Budesonide ER 9 MG TB24, TAKE 1 TABLET BY MOUTH ONCE DAILY. , Disp: , Rfl:     buPROPion (WELLBUTRIN) 100 MG tablet, Take 100 mg by mouth 2 times daily, Disp: , Rfl:     calcipotriene (DOVONEX) 0.005 % cream, APPLY 2-3 GRAMS TO AFFECTED AREA(S) 3-4 TIMES DAILY. (1 GRAM = 1 DIME SIZE AMOUNT) AS NEEDED, Disp: , Rfl:     Calcium-Vitamin D-Vitamin K 500-100-40 MG-UNT-MCG CHEW, 500 mg., Disp: , Rfl:     vitamin D (CHOLECALCIFEROL) 125 MCG (5000 UT) CAPS capsule, TAKE ONE CAPSULE BY MOUTH EVERY DAY, Disp: , Rfl:     cyanocobalamin 1000 MCG/ML injection, Inject 1 mL (1,000 mcg total) into the shoulder, thigh, or buttocks every 30 (thirty) days. Inject as directed: Dispense 1 multidose vial: Dispense injection syringes with medication. 12 each 21g needle with syringe., Disp: , Rfl:     diclofenac sodium (VOLTAREN) 1 % GEL, APPLY 3 4 GRAMS TO AFFECTED AREA THREE TIMES DAILY AS NEEDED, Disp: , Rfl:     dicyclomine (BENTYL) 10 MG capsule, Take 10 mg by mouth 2 times daily, Disp: , Rfl:     diphenhydrAMINE (SOMINEX) 25 MG tablet, Take 50 mg by mouth, Disp: , Rfl:     doxycycline monohydrate (MONODOX) 100 MG capsule, Take 100 mg by mouth 2 times daily, Disp: , Rfl:     ergocalciferol (ERGOCALCIFEROL) 1.25 MG (35139 UT) capsule, Take 1 capsule (50,000 Units total) by mouth daily. , Disp: , Rfl:     fluticasone (FLONASE) 50 MCG/ACT nasal spray, 1 spray by Each Nare route 2 (two) times a day., Disp: , Rfl:     ipratropium-albuterol (DUONEB) 0.5-2.5 (3) MG/3ML SOLN nebulizer solution, Take 3 mL by nebulization every 6 (six) hours as needed for wheezing., Disp: , Rfl:     levocetirizine (XYZAL) 5 MG tablet, Take 5 mg by mouth daily as needed, Disp: , Rfl:     lidocaine (XYLOCAINE) 5 % ointment, APPLY 2-3 GRAMS TO AFFECTED AREA(S) 3-4 TIMES DAILY. (1 GRAM = 1 DIME SIZE AMOUNT) AS NEEDED, Disp: , Rfl:     loratadine (CLARITIN) 10 MG tablet, TAKE 1 TABLET BY MOUTH EVERY DAY, Disp: , Rfl:     montelukast (SINGULAIR) 10 MG tablet, TAKE 1 TABLET BY MOUTH EVERY DAY, Disp: , Rfl:     ofloxacin (FLOXIN) 0.3 % otic solution, Place 5 drops in ear(s) 2 times daily, Disp: , Rfl:     oxyCODONE HCl (OXY-IR) 10 MG immediate release tablet, TAKE 1 TAB BY MOUTH EVERY 6 HOURS, Disp: , Rfl:     oxyCODONE-acetaminophen (PERCOCET) 5-325 MG per tablet, TAKE 1 TO 2 TABLETS BY MOUTH EVERY 8 HOURS AS NEEDED FOR PAIN, Disp: , Rfl:     pantoprazole (PROTONIX) 40 MG tablet, Take 40 mg by mouth 2 times daily, Disp: , Rfl:     propranolol (INDERAL) 10 MG tablet, TAKE 1 TAB BY MOUTH THREE (3) TIMES DAILY FOR 90 DAYS., Disp: , Rfl:     brompheniramine-pseudoephedrine-DM 2-30-10 MG/5ML syrup, Take 5 mLs by mouth 4 times daily as needed, Disp: , Rfl:     triamcinolone acetonide (KENALOG) 0.1 % paste, Apply 0.25 inches to teeth 2 (two) times a day., Disp: , Rfl:     Allergies   Allergen Reactions    Latex Other (See Comments)     02 TUBING TUBING FROM PAIN PUMP    Penicillin G Hives, Other (See Comments) and Rash     WHEEZING    Codeine Itching and Rash     Other reaction(s): Abdominal pain-Intolerance  Due to nausea, has taken since then & did fine, no side effects    Morphine Itching    Adhesive Tape Hives     ADHESIVE TAPE  BLISTERS        Hydrocortisone Other (See Comments)     IV infusion infiltrated and caused blisters. Review of Systems   Constitutional:  Negative for chills, fatigue and fever. HENT:  Positive for congestion, rhinorrhea and sinus pressure. Negative for postnasal drip. Eyes:  Negative for visual disturbance. Respiratory:  Negative for cough, chest tightness and shortness of breath. Cardiovascular:  Negative for chest pain and palpitations. Gastrointestinal:  Negative for abdominal pain, blood in stool, diarrhea, nausea and vomiting. Genitourinary:  Negative for dysuria, frequency and urgency. Musculoskeletal:  Positive for back pain and myalgias. Negative for arthralgias. Skin: Negative. Neurological:  Negative for numbness and headaches.    Psychiatric/Behavioral:  Negative for dysphoric mood and sleep disturbance. The patient is not nervous/anxious. Objective:  BP (!) 139/47   Pulse 67   Temp 98.4 °F (36.9 °C) (Temporal)   Resp 16   Ht 5' 2\" (1.575 m)   Wt 144 lb (65.3 kg)   SpO2 97%   BMI 26.34 kg/m²     Examination:  Physical Exam  Constitutional:       Appearance: Normal appearance. HENT:      Head: Normocephalic and atraumatic. Right Ear: Tympanic membrane and ear canal normal.      Left Ear: Tympanic membrane and ear canal normal.      Nose: Nose normal.      Mouth/Throat:      Mouth: Mucous membranes are moist.      Pharynx: Posterior oropharyngeal erythema present. Eyes:      Extraocular Movements: Extraocular movements intact. Conjunctiva/sclera: Conjunctivae normal.      Pupils: Pupils are equal, round, and reactive to light. Cardiovascular:      Rate and Rhythm: Normal rate and regular rhythm. Pulses: Normal pulses. Heart sounds: Normal heart sounds. Pulmonary:      Effort: Pulmonary effort is normal.      Breath sounds: Normal breath sounds. Abdominal:      General: Abdomen is flat. Bowel sounds are normal.      Palpations: Abdomen is soft. Musculoskeletal:      Cervical back: Normal range of motion and neck supple. Skin:     General: Skin is warm and dry. Neurological:      General: No focal deficit present. Mental Status: She is alert. Mental status is at baseline. Psychiatric:         Mood and Affect: Mood normal.         Thought Content: Thought content normal.         Assessment/Plan:    Sekou Wolff was seen today for skin problem. Diagnoses and all orders for this visit:    Chronic midline low back pain with right-sided sciatica  -     External Referral To Pain Medicine  -     [facility] Devices [facility]; Journey perfect sleep chair, chronic back pain    DDD (degenerative disc disease), lumbar  -     External Referral To Pain Medicine  -     [facility]  Devices KIT; Journey perfect sleep chair, chronic back pain    Seasonal allergic rhinitis due to pollen  -     betamethasone acetate-betamethasone sodium phosphate (CELESTONE) injection 6 mg    Acute nasopharyngitis  Instructed to take medications as prescribed, and to call if no improvement in symptoms.     -     levoFLOXacin (LEVAQUIN) 500 MG tablet; Take 1 tablet by mouth in the morning for 7 days. -     betamethasone acetate-betamethasone sodium phosphate (CELESTONE) injection 6 mg    Anxiety  Stable on present medications, continue as prescribed    -     DULoxetine (CYMBALTA) 20 MG extended release capsule; Take 1 capsule by mouth in the morning. Pneumococcal vaccination administered at current visit  -     Pneumococcal, PCV20, PREVNAR 21, (age 25 yrs+), IM, PF        Follow-up and Dispositions    Return in about 3 months (around 11/15/2022), or if symptoms worsen or fail to improve, for f/u w/ labs. Medication Reconciliation:  Current Medications Verified: Current medications/ immunizations were reviewed, including purpose, with patient. Family History, Social History, Current and Past Medical History was reviewed with patient and updated at today's office visit. Medication Reconciliation list was given to patient/ family. Patient was advised to discard old medication lists and provide all providers with current list at each visit and carry list with them in case of emergency.     Completed By:   Jessica Raymond MD    [facility]  [address], [address], [address]  [phone]  [phone] fax  5:39 PM

## 2022-08-16 ENCOUNTER — TELEPHONE (OUTPATIENT)
Dept: INTERNAL MEDICINE CLINIC | Facility: CLINIC | Age: 62
End: 2022-08-16

## 2022-08-16 NOTE — TELEPHONE ENCOUNTER
Received a call from [facility] stating pt's Rx for Journey perfect sleep chair needs to go to [facility]. Thank you.

## 2022-08-17 ENCOUNTER — TELEPHONE (OUTPATIENT)
Dept: INTERNAL MEDICINE CLINIC | Facility: CLINIC | Age: 62
End: 2022-08-17

## 2022-08-17 DIAGNOSIS — M54.41 CHRONIC MIDLINE LOW BACK PAIN WITH RIGHT-SIDED SCIATICA: ICD-10-CM

## 2022-08-17 DIAGNOSIS — M51.36 DDD (DEGENERATIVE DISC DISEASE), LUMBAR: ICD-10-CM

## 2022-08-17 DIAGNOSIS — G89.29 CHRONIC MIDLINE LOW BACK PAIN WITH RIGHT-SIDED SCIATICA: ICD-10-CM

## 2022-08-17 NOTE — TELEPHONE ENCOUNTER
Received a call from [facility] stating pt's Rx for Journey perfect sleep chair needs to go to [facility]. Thank you.     Prescription sent to pharmacy, keep follow up appointment;

## 2022-08-24 ENCOUNTER — TELEMEDICINE (OUTPATIENT)
Dept: INTERNAL MEDICINE CLINIC | Facility: CLINIC | Age: 62
End: 2022-08-24
Payer: COMMERCIAL

## 2022-08-24 DIAGNOSIS — J01.00 ACUTE NON-RECURRENT MAXILLARY SINUSITIS: Primary | ICD-10-CM

## 2022-08-24 PROCEDURE — 99213 OFFICE O/P EST LOW 20 MIN: CPT | Performed by: NURSE PRACTITIONER

## 2022-08-24 RX ORDER — DOXYCYCLINE HYCLATE 100 MG
100 TABLET ORAL 2 TIMES DAILY
Qty: 20 TABLET | Refills: 0 | Status: SHIPPED | OUTPATIENT
Start: 2022-08-24 | End: 2022-09-03

## 2022-08-24 ASSESSMENT — ENCOUNTER SYMPTOMS
EYE DISCHARGE: 0
COUGH: 1
DIARRHEA: 0
SINUS PAIN: 1
COLOR CHANGE: 0
BACK PAIN: 0
ABDOMINAL PAIN: 0
SHORTNESS OF BREATH: 0
ABDOMINAL DISTENTION: 0
EYE REDNESS: 0
APNEA: 0
EYE ITCHING: 0
CONSTIPATION: 0
NAUSEA: 0
EYE PAIN: 0
SINUS PRESSURE: 1

## 2022-08-24 NOTE — PROGRESS NOTES
mouth 2 times daily 180 capsule 1    SUMAtriptan (IMITREX) 100 MG tablet Take 1 tablet by mouth once as needed for Migraine 27 tablet 1    FERROUS SULFATE PO Take 1 tablet by mouth daily. OMEGA-3 FATTY ACIDS-VITAMIN E PO Take by mouth      albuterol sulfate  (90 Base) MCG/ACT inhaler INHALE 2 PUFFS EVERY 6 (SIX) HOURS AS NEEDED FOR WHEEZING. ascorbic acid (VITAMIN C) 500 MG tablet Take 1 tablet (500 mg) by mouth daily. azaTHIOprine (IMURAN) 50 MG tablet TAKE 1 TABLET BY MOUTH 2 TIMES A DAY. Budesonide ER 9 MG TB24 TAKE 1 TABLET BY MOUTH ONCE DAILY. buPROPion (WELLBUTRIN) 100 MG tablet Take 100 mg by mouth 2 times daily      calcipotriene (DOVONEX) 0.005 % cream APPLY 2-3 GRAMS TO AFFECTED AREA(S) 3-4 TIMES DAILY. (1 GRAM = 1 DIME SIZE AMOUNT) AS NEEDED      Calcium-Vitamin D-Vitamin K 500-100-40 MG-UNT-MCG CHEW 500 mg.      vitamin D (CHOLECALCIFEROL) 125 MCG (5000 UT) CAPS capsule TAKE ONE CAPSULE BY MOUTH EVERY DAY      cyanocobalamin 1000 MCG/ML injection Inject 1 mL (1,000 mcg total) into the shoulder, thigh, or buttocks every 30 (thirty) days. Inject as directed: Dispense 1 multidose vial: Dispense injection syringes with medication. 12 each 21g needle with syringe. diclofenac sodium (VOLTAREN) 1 % GEL APPLY 3 4 GRAMS TO AFFECTED AREA THREE TIMES DAILY AS NEEDED      dicyclomine (BENTYL) 10 MG capsule Take 10 mg by mouth 2 times daily      diphenhydrAMINE (SOMINEX) 25 MG tablet Take 50 mg by mouth      doxycycline monohydrate (MONODOX) 100 MG capsule Take 100 mg by mouth 2 times daily      ergocalciferol (ERGOCALCIFEROL) 1.25 MG (24956 UT) capsule Take 1 capsule (50,000 Units total) by mouth daily. fluticasone (FLONASE) 50 MCG/ACT nasal spray 1 spray by Each Nare route 2 (two) times a day. ipratropium-albuterol (DUONEB) 0.5-2.5 (3) MG/3ML SOLN nebulizer solution Take 3 mL by nebulization every 6 (six) hours as needed for wheezing.       levocetirizine (XYZAL) 5 MG tablet Take 5 mg by mouth daily as needed      lidocaine (XYLOCAINE) 5 % ointment APPLY 2-3 GRAMS TO AFFECTED AREA(S) 3-4 TIMES DAILY. (1 GRAM = 1 DIME SIZE AMOUNT) AS NEEDED      loratadine (CLARITIN) 10 MG tablet TAKE 1 TABLET BY MOUTH EVERY DAY      montelukast (SINGULAIR) 10 MG tablet TAKE 1 TABLET BY MOUTH EVERY DAY      ofloxacin (FLOXIN) 0.3 % otic solution Place 5 drops in ear(s) 2 times daily      oxyCODONE HCl (OXY-IR) 10 MG immediate release tablet TAKE 1 TAB BY MOUTH EVERY 6 HOURS      oxyCODONE-acetaminophen (PERCOCET) 5-325 MG per tablet TAKE 1 TO 2 TABLETS BY MOUTH EVERY 8 HOURS AS NEEDED FOR PAIN      pantoprazole (PROTONIX) 40 MG tablet Take 40 mg by mouth 2 times daily      propranolol (INDERAL) 10 MG tablet TAKE 1 TAB BY MOUTH THREE (3) TIMES DAILY FOR 90 DAYS. brompheniramine-pseudoephedrine-DM 2-30-10 MG/5ML syrup Take 5 mLs by mouth 4 times daily as needed      triamcinolone acetonide (KENALOG) 0.1 % paste Apply 0.25 inches to teeth 2 (two) times a day. No current facility-administered medications for this visit. Allergies   Allergen Reactions    Latex Other (See Comments)     02 TUBING TUBING FROM PAIN PUMP    Penicillin G Hives, Other (See Comments) and Rash     WHEEZING    Codeine Itching and Rash     Other reaction(s): Abdominal pain-Intolerance  Due to nausea, has taken since then & did fine, no side effects    Morphine Itching    Adhesive Tape Hives     ADHESIVE TAPE  BLISTERS        Hydrocortisone Other (See Comments)     IV infusion infiltrated and caused blisters.        Past Medical History:   Diagnosis Date    Anxiety     Crohn's disease ([facility] [address]     Depression     H/O seasonal allergies     History of bone density study 03/2018    osteoporosis    History of mammogram 03/2018    HTN (hypertension)     Insomnia     Liver cirrhosis secondary to CASTELLANOS (nonalcoholic steatohepatitis) ([facility] [address]      Past Surgical History:   Procedure Laterality Date    APPENDECTOMY      BACK SURGERY 2004    CHOLECYSTECTOMY      COLONOSCOPY  2017    Dr. Janine Mccormick  2012    DEEP AND BSO (CERVIX REMOVED)      TUBAL LIGATION       Family History   Problem Relation Age of Onset    Breast Cancer Mother      Social History     Tobacco Use    Smoking status: Former     Packs/day: 2.00     Types: Cigarettes     Quit date: 1997     Years since quittin.6    Smokeless tobacco: Never   Substance Use Topics    Alcohol use: No       Review of Systems   Constitutional:  Positive for fatigue. Negative for activity change, appetite change, chills and fever. HENT:  Positive for congestion, sinus pressure and sinus pain. Negative for ear pain. Eyes:  Negative for pain, discharge, redness and itching. Respiratory:  Positive for cough. Negative for apnea and shortness of breath. Cardiovascular:  Negative for chest pain, palpitations and leg swelling. Gastrointestinal:  Negative for abdominal distention, abdominal pain, constipation, diarrhea and nausea. Endocrine: Negative for cold intolerance and heat intolerance. Genitourinary:  Negative for difficulty urinating, dysuria, enuresis and urgency. Musculoskeletal:  Negative for arthralgias, back pain, joint swelling, myalgias and neck pain. Skin:  Negative for color change and rash. Neurological:  Negative for dizziness, weakness and headaches. Psychiatric/Behavioral:  Negative for behavioral problems and sleep disturbance. The patient is not nervous/anxious. OBJECTIVE:  There were no vitals taken for this visit. Physical Exam  Constitutional:       General: She is not in acute distress. Appearance: Normal appearance. She is ill-appearing. She is not toxic-appearing. HENT:      Nose: Congestion present. Cardiovascular:      Rate and Rhythm: Normal rate. Pulmonary:      Effort: Pulmonary effort is normal. No respiratory distress. Skin:     General: Skin is warm and dry.    Neurological:      Mental Status: She is alert. Mental status is at baseline. Psychiatric:         Mood and Affect: Mood normal.         Behavior: Behavior normal.         Thought Content: Thought content normal.         ASSESSMENT and PLAN  Diagnoses and all orders for this visit:    Acute non-recurrent maxillary sinusitis  -     doxycycline hyclate (VIBRA-TABS) 100 MG tablet; Take 1 tablet by mouth 2 times daily for 10 days    Start on doxycycline. Offered to schedule time to come in and get covid tested but declined; advised to test OTC and notify if positive. Push fluids and rest. Return if symptoms worsen. Pursuant to the emergency declaration under the [facility], [facility] waiver authority and the [employer] and [employer] Act, this Virtual Visit was conducted, with patient's consent, to reduce the patient's risk of exposure to COVID-19 and provide continuity of care for an established patient. Services were provided through a video synchronous discussion virtually to substitute for in-person clinic visit. Patient consented to virtual visit. Greater than 50% of this 15 min visit was spent counseling the patient about test results, prognosis, importance of compliance, education about disease process, benefits of medications, instructions for management of acute symptoms, and follow up plans. Follow-up and Dispositions    Return if symptoms worsen or fail to improve.          Nahed Swenson NP, APRN - CNP

## 2022-09-03 DIAGNOSIS — F33.42 MAJOR DEPRESSIVE DISORDER, RECURRENT, IN FULL REMISSION (HCC): ICD-10-CM

## 2022-09-06 RX ORDER — POTASSIUM CHLORIDE 1500 MG/1
TABLET, FILM COATED, EXTENDED RELEASE ORAL
Qty: 90 TABLET | Refills: 1 | OUTPATIENT
Start: 2022-09-06

## 2022-09-06 RX ORDER — BUPROPION HYDROCHLORIDE 100 MG/1
TABLET ORAL
Qty: 180 TABLET | Refills: 1 | OUTPATIENT
Start: 2022-09-06

## 2022-09-06 RX ORDER — PROPRANOLOL HYDROCHLORIDE 10 MG/1
TABLET ORAL
Qty: 270 TABLET | Refills: 1 | OUTPATIENT
Start: 2022-09-06

## 2022-09-07 DIAGNOSIS — F33.41 RECURRENT MAJOR DEPRESSIVE DISORDER, IN PARTIAL REMISSION (HCC): Primary | ICD-10-CM

## 2022-09-07 DIAGNOSIS — F41.9 ANXIETY: ICD-10-CM

## 2022-09-07 DIAGNOSIS — K21.9 GASTROESOPHAGEAL REFLUX DISEASE WITHOUT ESOPHAGITIS: ICD-10-CM

## 2022-09-09 RX ORDER — DULOXETIN HYDROCHLORIDE 20 MG/1
20 CAPSULE, DELAYED RELEASE ORAL DAILY
Qty: 90 CAPSULE | Refills: 1 | Status: SHIPPED | OUTPATIENT
Start: 2022-09-09

## 2022-09-09 RX ORDER — PROPRANOLOL HYDROCHLORIDE 10 MG/1
TABLET ORAL
Qty: 270 TABLET | Refills: 1 | Status: SHIPPED | OUTPATIENT
Start: 2022-09-09

## 2022-09-09 RX ORDER — POTASSIUM CHLORIDE 20 MEQ/1
20 TABLET, EXTENDED RELEASE ORAL DAILY
Qty: 90 TABLET | Refills: 1 | Status: SHIPPED | OUTPATIENT
Start: 2022-09-09

## 2022-09-16 ENCOUNTER — TELEMEDICINE (OUTPATIENT)
Dept: INTERNAL MEDICINE CLINIC | Facility: CLINIC | Age: 62
End: 2022-09-16
Payer: COMMERCIAL

## 2022-09-16 ENCOUNTER — TELEPHONE (OUTPATIENT)
Dept: INTERNAL MEDICINE CLINIC | Facility: CLINIC | Age: 62
End: 2022-09-16

## 2022-09-16 DIAGNOSIS — Z91.81 HISTORY OF FALL: ICD-10-CM

## 2022-09-16 DIAGNOSIS — R30.0 DYSURIA: Primary | ICD-10-CM

## 2022-09-16 DIAGNOSIS — R25.1 TREMORS OF NERVOUS SYSTEM: ICD-10-CM

## 2022-09-16 PROCEDURE — 99213 OFFICE O/P EST LOW 20 MIN: CPT | Performed by: INTERNAL MEDICINE

## 2022-09-16 RX ORDER — CIPROFLOXACIN 250 MG/1
250 TABLET, FILM COATED ORAL 2 TIMES DAILY
Qty: 6 TABLET | Refills: 0 | Status: SHIPPED | OUTPATIENT
Start: 2022-09-16 | End: 2022-09-19

## 2022-09-16 ASSESSMENT — ENCOUNTER SYMPTOMS: RESPIRATORY NEGATIVE: 1

## 2022-09-16 NOTE — PROGRESS NOTES
[facility]      2022    Patient Name: Rc Hankins  :  1960    Subjective:    Chief Complaint:  Chief Complaint   Patient presents with    Lower Back Pain         HPI I was at home while conducting this encounter. Consent:  She and/or her healthcare decision maker is aware that this patient-initiated Telehealth encounter is a billable service, with coverage as determined by her insurance carrier. She is aware that she may receive a bill and has provided verbal consent to proceed: Yes    This virtual visit was conducted via [employer]. Pursuant to the emergency declaration under the [facility], [facility] waiver authority and the [employer] Act, this Virtual  Visit was conducted to reduce the patient's risk of exposure to COVID-19 and provide continuity of care for an established patient. Services were provided through a video synchronous discussion virtually to substitute for in-person clinic visit. Due to this being a TeleHealth evaluation, many elements of the physical examination are unable to be assessed. Total Time: minutes: 11-20 minutes. She had a fall 2 days ago; she grabbed on to the rail on her porch, fell on her bottom; she and  are going through a divorce; she is considering moving into her daughter's out building; c/o chills, bladder pressure; she denies fever; she has been going to Pain Management and is prescribed Oxycodone; she is not interested in using a cane because \"I trip over them\"; She is concerned about recurrent tremors, and would like to see a Neurologist again;      Past Medical History:   Diagnosis Date    Anxiety     Crohn's disease ([facility] [address]     Depression     H/O seasonal allergies     History of bone density study 2018    osteoporosis    History of mammogram 2018    HTN (hypertension)     Insomnia     Liver cirrhosis secondary to CASTELLANOS (nonalcoholic capsule by mouth daily TAKE 1 CAP BY MOUTH DAILY FOR 90 DAYS., Disp: 90 capsule, Rfl: 1    pregabalin (LYRICA) 150 MG capsule, Take 1 capsule by mouth 2 times daily for 30 days. , Disp: 60 capsule, Rfl: 2    FLUoxetine (PROZAC) 40 MG capsule, Take 1 capsule by mouth 2 times daily, Disp: 180 capsule, Rfl: 1    SUMAtriptan (IMITREX) 100 MG tablet, Take 1 tablet by mouth once as needed for Migraine, Disp: 27 tablet, Rfl: 1    FERROUS SULFATE PO, Take 1 tablet by mouth daily. , Disp: , Rfl:     OMEGA-3 FATTY ACIDS-VITAMIN E PO, Take by mouth, Disp: , Rfl:     albuterol sulfate  (90 Base) MCG/ACT inhaler, INHALE 2 PUFFS EVERY 6 (SIX) HOURS AS NEEDED FOR WHEEZING., Disp: , Rfl:     ascorbic acid (VITAMIN C) 500 MG tablet, Take 1 tablet (500 mg) by mouth daily. , Disp: , Rfl:     azaTHIOprine (IMURAN) 50 MG tablet, TAKE 1 TABLET BY MOUTH 2 TIMES A DAY., Disp: , Rfl:     Budesonide ER 9 MG TB24, TAKE 1 TABLET BY MOUTH ONCE DAILY. , Disp: , Rfl:     buPROPion (WELLBUTRIN) 100 MG tablet, Take 100 mg by mouth 2 times daily, Disp: , Rfl:     calcipotriene (DOVONEX) 0.005 % cream, APPLY 2-3 GRAMS TO AFFECTED AREA(S) 3-4 TIMES DAILY. (1 GRAM = 1 DIME SIZE AMOUNT) AS NEEDED, Disp: , Rfl:     Calcium-Vitamin D-Vitamin K 500-100-40 MG-UNT-MCG CHEW, 500 mg., Disp: , Rfl:     vitamin D (CHOLECALCIFEROL) 125 MCG (5000 UT) CAPS capsule, TAKE ONE CAPSULE BY MOUTH EVERY DAY, Disp: , Rfl:     cyanocobalamin 1000 MCG/ML injection, Inject 1 mL (1,000 mcg total) into the shoulder, thigh, or buttocks every 30 (thirty) days. Inject as directed: Dispense 1 multidose vial: Dispense injection syringes with medication.  12 each 21g needle with syringe., Disp: , Rfl:     diclofenac sodium (VOLTAREN) 1 % GEL, APPLY 3 4 GRAMS TO AFFECTED AREA THREE TIMES DAILY AS NEEDED, Disp: , Rfl:     dicyclomine (BENTYL) 10 MG capsule, Take 10 mg by mouth 2 times daily, Disp: , Rfl:     diphenhydrAMINE (SOMINEX) 25 MG tablet, Take 50 mg by mouth, Disp: , Rfl: doxycycline monohydrate (MONODOX) 100 MG capsule, Take 100 mg by mouth 2 times daily, Disp: , Rfl:     ergocalciferol (ERGOCALCIFEROL) 1.25 MG (37209 UT) capsule, Take 1 capsule (50,000 Units total) by mouth daily. , Disp: , Rfl:     fluticasone (FLONASE) 50 MCG/ACT nasal spray, 1 spray by Each Nare route 2 (two) times a day., Disp: , Rfl:     ipratropium-albuterol (DUONEB) 0.5-2.5 (3) MG/3ML SOLN nebulizer solution, Take 3 mL by nebulization every 6 (six) hours as needed for wheezing., Disp: , Rfl:     levocetirizine (XYZAL) 5 MG tablet, Take 5 mg by mouth daily as needed, Disp: , Rfl:     lidocaine (XYLOCAINE) 5 % ointment, APPLY 2-3 GRAMS TO AFFECTED AREA(S) 3-4 TIMES DAILY. (1 GRAM = 1 DIME SIZE AMOUNT) AS NEEDED, Disp: , Rfl:     loratadine (CLARITIN) 10 MG tablet, TAKE 1 TABLET BY MOUTH EVERY DAY, Disp: , Rfl:     montelukast (SINGULAIR) 10 MG tablet, TAKE 1 TABLET BY MOUTH EVERY DAY, Disp: , Rfl:     ofloxacin (FLOXIN) 0.3 % otic solution, Place 5 drops in ear(s) 2 times daily, Disp: , Rfl:     oxyCODONE HCl (OXY-IR) 10 MG immediate release tablet, TAKE 1 TAB BY MOUTH EVERY 6 HOURS, Disp: , Rfl:     oxyCODONE-acetaminophen (PERCOCET) 5-325 MG per tablet, TAKE 1 TO 2 TABLETS BY MOUTH EVERY 8 HOURS AS NEEDED FOR PAIN, Disp: , Rfl:     pantoprazole (PROTONIX) 40 MG tablet, Take 40 mg by mouth 2 times daily, Disp: , Rfl:     brompheniramine-pseudoephedrine-DM 2-30-10 MG/5ML syrup, Take 5 mLs by mouth 4 times daily as needed, Disp: , Rfl:     triamcinolone acetonide (KENALOG) 0.1 % paste, Apply 0.25 inches to teeth 2 (two) times a day., Disp: , Rfl:     Allergies   Allergen Reactions    Latex Other (See Comments)     02 TUBING TUBING FROM PAIN PUMP    Penicillin G Hives, Other (See Comments) and Rash     WHEEZING    Codeine Itching and Rash     Other reaction(s): Abdominal pain-Intolerance  Due to nausea, has taken since then & did fine, no side effects    Morphine Itching    Adhesive Tape Hives     ADHESIVE TAPE  BLISTERS        Hydrocortisone Other (See Comments)     IV infusion infiltrated and caused blisters. Review of Systems   Constitutional: Negative. HENT: Negative. Respiratory: Negative. Cardiovascular: Negative. Genitourinary:  Positive for dysuria. Neurological:  Positive for tremors. Objective: There were no vitals taken for this visit. Examination:  Physical Exam  Neurological:      Mental Status: She is alert. Psychiatric:         Mood and Affect: Mood normal.         Thought Content: Thought content normal.         Judgment: Judgment normal.         Assessment/Plan:    Heydi Irving was seen today for lower back pain. Diagnoses and all orders for this visit:    Dysuria  Instructed to take medications as prescribed, and to call if no improvement in symptoms. -     ciprofloxacin (CIPRO) 250 MG tablet; Take 1 tablet by mouth 2 times daily for 3 days    History of fall  -     External Referral To Neurology    Tremors of nervous system  -     External Referral To Neurology        Follow-up and Dispositions    Return if symptoms worsen or fail to improve. Medication Reconciliation:  Current Medications Verified: Current medications/ immunizations were reviewed, including purpose, with patient. Family History, Social History, Current and Past Medical History was reviewed with patient and updated at today's office visit. Medication Reconciliation list was given to patient/ family. Patient was advised to discard old medication lists and provide all providers with current list at each visit and carry list with them in case of emergency.     Completed By:   Irineo Alcantara MD    [facility] & [address], [address], [address]  [phone]  [phone] fax  5:44 PM

## 2022-09-21 ENCOUNTER — OFFICE VISIT (OUTPATIENT)
Dept: INTERNAL MEDICINE CLINIC | Facility: CLINIC | Age: 62
End: 2022-09-21
Payer: COMMERCIAL

## 2022-09-21 VITALS
HEIGHT: 62 IN | WEIGHT: 146.6 LBS | OXYGEN SATURATION: 96 % | DIASTOLIC BLOOD PRESSURE: 62 MMHG | TEMPERATURE: 98.2 F | SYSTOLIC BLOOD PRESSURE: 109 MMHG | HEART RATE: 69 BPM | RESPIRATION RATE: 16 BRPM | BODY MASS INDEX: 26.98 KG/M2

## 2022-09-21 DIAGNOSIS — Z74.09 MOBILITY IMPAIRED: ICD-10-CM

## 2022-09-21 DIAGNOSIS — M51.36 DDD (DEGENERATIVE DISC DISEASE), LUMBAR: ICD-10-CM

## 2022-09-21 DIAGNOSIS — R11.2 INTRACTABLE VOMITING WITH NAUSEA, UNSPECIFIED VOMITING TYPE: ICD-10-CM

## 2022-09-21 DIAGNOSIS — M81.0 AGE-RELATED OSTEOPOROSIS WITHOUT CURRENT PATHOLOGICAL FRACTURE: ICD-10-CM

## 2022-09-21 DIAGNOSIS — J02.9 SORE THROAT: ICD-10-CM

## 2022-09-21 DIAGNOSIS — G89.29 CHRONIC MIDLINE LOW BACK PAIN WITH RIGHT-SIDED SCIATICA: ICD-10-CM

## 2022-09-21 DIAGNOSIS — R19.7 DIARRHEA, UNSPECIFIED TYPE: ICD-10-CM

## 2022-09-21 DIAGNOSIS — Z76.89 ENCOUNTER FOR POWER MOBILITY DEVICE ASSESSMENT: Primary | ICD-10-CM

## 2022-09-21 DIAGNOSIS — M54.41 CHRONIC MIDLINE LOW BACK PAIN WITH RIGHT-SIDED SCIATICA: ICD-10-CM

## 2022-09-21 LAB
EXP DATE SOLUTION: NORMAL
EXP DATE SWAB: NORMAL
LOT NUMBER SOLUTION: NORMAL
LOT NUMBER SWAB: NORMAL
SARS-COV-2 RNA, POC: NEGATIVE

## 2022-09-21 PROCEDURE — 99214 OFFICE O/P EST MOD 30 MIN: CPT | Performed by: INTERNAL MEDICINE

## 2022-09-21 PROCEDURE — 87635 SARS-COV-2 COVID-19 AMP PRB: CPT | Performed by: INTERNAL MEDICINE

## 2022-09-21 ASSESSMENT — ENCOUNTER SYMPTOMS
ABDOMINAL PAIN: 0
SHORTNESS OF BREATH: 0
NAUSEA: 0
BACK PAIN: 1
CHEST TIGHTNESS: 0
SINUS PRESSURE: 0
RHINORRHEA: 0
COUGH: 0
DIARRHEA: 1
VOMITING: 0
BLOOD IN STOOL: 0

## 2022-09-21 NOTE — PROGRESS NOTES
[facility]      2022    Patient Name: Monet Kumari  :  1960    Subjective:    Chief Complaint:  Chief Complaint   Patient presents with    Other     Pt is here for evaluation for a lift chair. HPI Here for mobility evaluation; she has history of lumbar DDD, chronic low back pain with R sided sciatica, osteoporosis; she fell 3 times last night while packing, as she is planning on moving; she is moving to her daughter's out building; she and  are ; she does not use cane or walker to get around; she fell backwards, did not hit her head; she has had some diarrhea and vomiting since last night; she denies fevers, chills; no known sick contacts; she is requesting a lift chair and a power WC today; she does not have any paper work with her today, but will send up copies; she reports difficulty rising from seated position; she reports difficulty getting around her home from one room to another; she used a power wheel chair in the past, but \"it's getting old\" and no longer working; her current WC is over 11years old;     Past Medical History:   Diagnosis Date    Anxiety     Crohn's disease ([facility] [address]     Depression     H/O seasonal allergies     History of bone density study 2018    osteoporosis    History of mammogram 2018    HTN (hypertension)     Insomnia     Liver cirrhosis secondary to CASTELLANOS (nonalcoholic steatohepatitis) [facility])        Past Surgical History:   Procedure Laterality Date    APPENDECTOMY      BACK SURGERY      CHOLECYSTECTOMY      COLONOSCOPY  2017    Dr. Yasmine Mayorga  2012    DEEP AND BSO (CERVIX REMOVED)      TUBAL LIGATION         Family History   Problem Relation Age of Onset    Breast Cancer Mother        Social History     Tobacco Use    Smoking status: Former     Packs/day: 2.00     Types: Cigarettes     Quit date: 1997     Years since quittin.7    Smokeless tobacco: Never   Substance Use Topics    Alcohol use:  No Drug use: No                 Current Outpatient Medications:     propranolol (INDERAL) 10 MG tablet, TAKE 1 TAB BY MOUTH THREE (3) TIMES DAILY FOR 90 DAYS., Disp: 270 tablet, Rfl: 1    potassium chloride (KLOR-CON M) 20 MEQ extended release tablet, Take 1 tablet by mouth daily, Disp: 90 tablet, Rfl: 1    DULoxetine (CYMBALTA) 20 MG extended release capsule, Take 1 capsule by mouth daily, Disp: 90 capsule, Rfl: 1    Misc. Devices KIT, [place] perfect sleep chair, chronic back pain, Disp: 1 kit, Rfl: 0    ALPRAZolam (XANAX) 2 MG tablet, Take 1 tablet by mouth 3 times daily as needed for Anxiety for up to 90 days. , Disp: 90 tablet, Rfl: 2    methocarbamol (ROBAXIN) 500 MG tablet, TAKE 1/2 TO 1 TAB 3 TIMES DAILY AS NEEDED FOR PAIN, Disp: , Rfl:     estradiol (ESTRACE) 0.1 MG/GM vaginal cream, APPLY HALF A GRAM INTO THE VAGINA NIGHTLY FOR 2 WEEKS THEN TWICE WEEK, Disp: , Rfl:     traZODone (DESYREL) 50 MG tablet, Take 2 tablets by mouth nightly, Disp: 60 tablet, Rfl: 5    ondansetron (ZOFRAN-ODT) 8 MG TBDP disintegrating tablet, Place 1 tablet under the tongue every 8 hours as needed for Nausea or Vomiting TAKE 1 TAB BY MOUTH EVERY EIGHT (8) HOURS AS NEEDED FOR NAUSEA, Disp: 90 tablet, Rfl: 2    celecoxib (CELEBREX) 200 MG capsule, Take 1 capsule by mouth daily TAKE 1 CAP BY MOUTH DAILY FOR 90 DAYS., Disp: 90 capsule, Rfl: 1    pregabalin (LYRICA) 150 MG capsule, Take 1 capsule by mouth 2 times daily for 30 days. , Disp: 60 capsule, Rfl: 2    FLUoxetine (PROZAC) 40 MG capsule, Take 1 capsule by mouth 2 times daily, Disp: 180 capsule, Rfl: 1    SUMAtriptan (IMITREX) 100 MG tablet, Take 1 tablet by mouth once as needed for Migraine, Disp: 27 tablet, Rfl: 1    FERROUS SULFATE PO, Take 1 tablet by mouth daily. , Disp: , Rfl:     OMEGA-3 FATTY ACIDS-VITAMIN E PO, Take by mouth, Disp: , Rfl:     albuterol sulfate  (90 Base) MCG/ACT inhaler, INHALE 2 PUFFS EVERY 6 (SIX) HOURS AS NEEDED FOR WHEEZING., Disp: , Rfl: ascorbic acid (VITAMIN C) 500 MG tablet, Take 1 tablet (500 mg) by mouth daily. , Disp: , Rfl:     azaTHIOprine (IMURAN) 50 MG tablet, TAKE 1 TABLET BY MOUTH 2 TIMES A DAY., Disp: , Rfl:     Budesonide ER 9 MG TB24, TAKE 1 TABLET BY MOUTH ONCE DAILY. , Disp: , Rfl:     buPROPion (WELLBUTRIN) 100 MG tablet, Take 100 mg by mouth 2 times daily, Disp: , Rfl:     calcipotriene (DOVONEX) 0.005 % cream, APPLY 2-3 GRAMS TO AFFECTED AREA(S) 3-4 TIMES DAILY. (1 GRAM = 1 DIME SIZE AMOUNT) AS NEEDED, Disp: , Rfl:     Calcium-Vitamin D-Vitamin K 500-100-40 MG-UNT-MCG CHEW, 500 mg., Disp: , Rfl:     vitamin D (CHOLECALCIFEROL) 125 MCG (5000 UT) CAPS capsule, TAKE ONE CAPSULE BY MOUTH EVERY DAY, Disp: , Rfl:     cyanocobalamin 1000 MCG/ML injection, Inject 1 mL (1,000 mcg total) into the shoulder, thigh, or buttocks every 30 (thirty) days. Inject as directed: Dispense 1 multidose vial: Dispense injection syringes with medication. 12 each 21g needle with syringe., Disp: , Rfl:     diclofenac sodium (VOLTAREN) 1 % GEL, APPLY 3 4 GRAMS TO AFFECTED AREA THREE TIMES DAILY AS NEEDED, Disp: , Rfl:     dicyclomine (BENTYL) 10 MG capsule, Take 10 mg by mouth 2 times daily, Disp: , Rfl:     diphenhydrAMINE (SOMINEX) 25 MG tablet, Take 50 mg by mouth, Disp: , Rfl:     doxycycline monohydrate (MONODOX) 100 MG capsule, Take 100 mg by mouth 2 times daily, Disp: , Rfl:     ergocalciferol (ERGOCALCIFEROL) 1.25 MG (63863 UT) capsule, Take 1 capsule (50,000 Units total) by mouth daily. , Disp: , Rfl:     fluticasone (FLONASE) 50 MCG/ACT nasal spray, 1 spray by Each Nare route 2 (two) times a day., Disp: , Rfl:     ipratropium-albuterol (DUONEB) 0.5-2.5 (3) MG/3ML SOLN nebulizer solution, Take 3 mL by nebulization every 6 (six) hours as needed for wheezing., Disp: , Rfl:     levocetirizine (XYZAL) 5 MG tablet, Take 5 mg by mouth daily as needed, Disp: , Rfl:     lidocaine (XYLOCAINE) 5 % ointment, APPLY 2-3 GRAMS TO AFFECTED AREA(S) 3-4 TIMES DAILY.  (1 and myalgias. Skin: Negative. Neurological:  Positive for numbness. Negative for headaches. Psychiatric/Behavioral:  Negative for dysphoric mood and sleep disturbance. The patient is not nervous/anxious. Objective:  /62   Pulse 69   Temp 98.2 °F (36.8 °C) (Temporal)   Resp 16   Ht 5' 2\" (1.575 m)   Wt 146 lb 9.6 oz (66.5 kg)   SpO2 96%   BMI 26.81 kg/m²     Examination:  Physical Exam  Constitutional:       Appearance: Normal appearance. HENT:      Head: Normocephalic and atraumatic. Right Ear: Tympanic membrane and ear canal normal.      Left Ear: Tympanic membrane and ear canal normal.      Nose: Nose normal.      Mouth/Throat:      Mouth: Mucous membranes are moist.   Eyes:      Extraocular Movements: Extraocular movements intact. Conjunctiva/sclera: Conjunctivae normal.      Pupils: Pupils are equal, round, and reactive to light. Cardiovascular:      Rate and Rhythm: Normal rate and regular rhythm. Pulses: Normal pulses. Heart sounds: Normal heart sounds. Pulmonary:      Effort: Pulmonary effort is normal.      Breath sounds: Normal breath sounds. Abdominal:      General: Abdomen is flat. Bowel sounds are normal.      Palpations: Abdomen is soft. Musculoskeletal:         General: Tenderness present. Cervical back: Normal range of motion and neck supple. Lumbar back: Tenderness present. Decreased range of motion. Skin:     General: Skin is warm and dry. Neurological:      General: No focal deficit present. Mental Status: She is alert. Mental status is at baseline. Psychiatric:         Mood and Affect: Mood normal.         Thought Content: Thought content normal.         Assessment/Plan:    Becca Schmitz was seen today for other. Diagnoses and all orders for this visit:    Encounter for power mobility device assessment  She will bring necessary paperwork for me to complete.     Mobility impaired    Chronic midline low back pain with right-sided sciatica    DDD (degenerative disc disease), lumbar    Age-related osteoporosis without current pathological fracture    Sore throat  She was negative for Covid 19 today;     -     AMB POC COVID-19 COV    Diarrhea, unspecified type  She has history of Crohn's disease, has regular f/u w/ GI;     -     AMB POC COVID-19 COV    Intractable vomiting with nausea, unspecified vomiting type  -     AMB POC COVID-19 COV        Follow-up and Dispositions    Return if symptoms worsen or fail to improve, for keep f/u in November. Medication Reconciliation:  Current Medications Verified: Current medications/ immunizations were reviewed, including purpose, with patient. Family History, Social History, Current and Past Medical History was reviewed with patient and updated at today's office visit. Medication Reconciliation list was given to patient/ family. Patient was advised to discard old medication lists and provide all providers with current list at each visit and carry list with them in case of emergency.     Completed By:   Veronica Lewis MD    [facility]  [address] [place] [address], [address], [address]  [phone]  [phone] fax  2:55 PM

## 2022-09-23 ENCOUNTER — TELEPHONE (OUTPATIENT)
Dept: INTERNAL MEDICINE CLINIC | Facility: CLINIC | Age: 62
End: 2022-09-23

## 2022-09-23 DIAGNOSIS — J01.00 ACUTE NON-RECURRENT MAXILLARY SINUSITIS: Primary | ICD-10-CM

## 2022-09-23 RX ORDER — AZITHROMYCIN 250 MG/1
250 TABLET, FILM COATED ORAL SEE ADMIN INSTRUCTIONS
Qty: 6 TABLET | Refills: 0 | Status: SHIPPED | OUTPATIENT
Start: 2022-09-23 | End: 2022-09-28

## 2022-09-23 NOTE — TELEPHONE ENCOUNTER
Pt wants to know if a Rx for Jackelyn Curtis can be sent to her pharmacy? Pt was swabbed for Covid, because of sore throat and diarrhea. She was negative for Covid, but she still has the sore throat and now has sinus congestion and headache. Pt states she is miserable. Please advise. Thank you.

## 2022-09-26 RX ORDER — BUPROPION HYDROCHLORIDE 100 MG/1
TABLET ORAL
Qty: 180 TABLET | Refills: 1 | OUTPATIENT
Start: 2022-09-26

## 2022-10-01 DIAGNOSIS — K50.80 CROHN'S DISEASE OF BOTH SMALL AND LARGE INTESTINE WITHOUT COMPLICATIONS (HCC): ICD-10-CM

## 2022-10-01 DIAGNOSIS — F51.01 PRIMARY INSOMNIA: ICD-10-CM

## 2022-10-03 RX ORDER — DICYCLOMINE HYDROCHLORIDE 10 MG/1
CAPSULE ORAL
Qty: 180 CAPSULE | Refills: 1 | OUTPATIENT
Start: 2022-10-03

## 2022-10-03 RX ORDER — TRAZODONE HYDROCHLORIDE 50 MG/1
TABLET ORAL
Qty: 180 TABLET | Refills: 1 | OUTPATIENT
Start: 2022-10-03

## 2022-10-05 DIAGNOSIS — F41.9 ANXIETY: ICD-10-CM

## 2022-10-05 RX ORDER — ALPRAZOLAM 2 MG/1
2 TABLET ORAL 3 TIMES DAILY PRN
Qty: 90 TABLET | Refills: 2 | Status: SHIPPED | OUTPATIENT
Start: 2022-10-05 | End: 2023-01-03

## 2022-10-14 ENCOUNTER — TELEMEDICINE (OUTPATIENT)
Dept: INTERNAL MEDICINE CLINIC | Facility: CLINIC | Age: 62
End: 2022-10-14
Payer: COMMERCIAL

## 2022-10-14 DIAGNOSIS — K21.9 GASTROESOPHAGEAL REFLUX DISEASE WITHOUT ESOPHAGITIS: ICD-10-CM

## 2022-10-14 DIAGNOSIS — K92.1 BLOOD IN STOOL: ICD-10-CM

## 2022-10-14 DIAGNOSIS — K50.80 CROHN'S DISEASE OF BOTH SMALL AND LARGE INTESTINE WITHOUT COMPLICATION (HCC): Primary | ICD-10-CM

## 2022-10-14 PROCEDURE — 99213 OFFICE O/P EST LOW 20 MIN: CPT | Performed by: INTERNAL MEDICINE

## 2022-10-14 RX ORDER — DICYCLOMINE HCL 20 MG
20 TABLET ORAL 4 TIMES DAILY PRN
Qty: 60 TABLET | Refills: 1 | Status: SHIPPED | OUTPATIENT
Start: 2022-10-14 | End: 2022-11-13

## 2022-10-14 ASSESSMENT — ENCOUNTER SYMPTOMS
RESPIRATORY NEGATIVE: 1
BLOOD IN STOOL: 1
ABDOMINAL PAIN: 1
DIARRHEA: 1

## 2022-10-14 NOTE — PROGRESS NOTES
[facility]      10/14/2022    Patient Name: Shell Zimmerman  :  1960    Subjective:    Chief Complaint:  Chief Complaint   Patient presents with    Chronic Pain         HPI I was at home while conducting this encounter. Consent:  She and/or her healthcare decision maker is aware that this patient-initiated Telehealth encounter is a billable service, with coverage as determined by her insurance carrier. She is aware that she may receive a bill and has provided verbal consent to proceed: Yes    This virtual visit was conducted via [employer]. Pursuant to the emergency declaration under the [facility], [facility] waiver authority and the [employer] and [employer] Act, this Virtual  Visit was conducted to reduce the patient's risk of exposure to COVID-19 and provide continuity of care for an established patient. Services were provided through a video synchronous discussion virtually to substitute for in-person clinic visit. Due to this being a TeleHealth evaluation, many elements of the physical examination are unable to be assessed. Total Time: minutes: 11-20 minutes.        C/o recurrent abdominal pain; he has Crohn's disease, GERD and has f/u w/ Dr. Marcela Pollack next month; he has pain when she eats or drinks; denies pain with swallowing, but \"doubles over\" once food hits her stomach; she has noticed blood in her stool; last EGD was \"years ago\"; she is taking Imuran as prescribed; she takes Celebrex 200 mg qd, and was advised today to stop taking; she does take Protonix 40 mg qd; she and  are going through a separation and it has been a stressful time;     Past Medical History:   Diagnosis Date    Anxiety     Crohn's disease ([facility] [address]     Depression     H/O seasonal allergies     History of bone density study 2018    osteoporosis    History of mammogram 2018    HTN (hypertension)     Insomnia     Liver cirrhosis secondary to CASTELLANOS (nonalcoholic steatohepatitis) [facility])        Past Surgical History:   Procedure Laterality Date    APPENDECTOMY      BACK SURGERY      CHOLECYSTECTOMY      COLONOSCOPY  2017    Dr. Jonathan Patterson      DEEP AND BSO (CERVIX REMOVED)      TUBAL LIGATION         Family History   Problem Relation Age of Onset    Breast Cancer Mother        Social History     Tobacco Use    Smoking status: Former     Packs/day: 2.00     Types: Cigarettes     Quit date: 1997     Years since quittin.8    Smokeless tobacco: Never   Substance Use Topics    Alcohol use: No    Drug use: No                 Current Outpatient Medications:     dicyclomine (BENTYL) 20 MG tablet, Take 1 tablet by mouth 4 times daily as needed (GERD), Disp: 60 tablet, Rfl: 1    ALPRAZolam (XANAX) 2 MG tablet, Take 1 tablet by mouth 3 times daily as needed for Anxiety for up to 90 days. , Disp: 90 tablet, Rfl: 2    propranolol (INDERAL) 10 MG tablet, TAKE 1 TAB BY MOUTH THREE (3) TIMES DAILY FOR 90 DAYS., Disp: 270 tablet, Rfl: 1    potassium chloride (KLOR-CON M) 20 MEQ extended release tablet, Take 1 tablet by mouth daily, Disp: 90 tablet, Rfl: 1    DULoxetine (CYMBALTA) 20 MG extended release capsule, Take 1 capsule by mouth daily, Disp: 90 capsule, Rfl: 1    Misc.  Devices KIT, [facility] perfect sleep chair, chronic back pain, Disp: 1 kit, Rfl: 0    methocarbamol (ROBAXIN) 500 MG tablet, TAKE 1/2 TO 1 TAB 3 TIMES DAILY AS NEEDED FOR PAIN, Disp: , Rfl:     estradiol (ESTRACE) 0.1 MG/GM vaginal cream, APPLY HALF A GRAM INTO THE VAGINA NIGHTLY FOR 2 WEEKS THEN TWICE WEEK, Disp: , Rfl:     traZODone (DESYREL) 50 MG tablet, Take 2 tablets by mouth nightly, Disp: 60 tablet, Rfl: 5    ondansetron (ZOFRAN-ODT) 8 MG TBDP disintegrating tablet, Place 1 tablet under the tongue every 8 hours as needed for Nausea or Vomiting TAKE 1 TAB BY MOUTH EVERY EIGHT (8) HOURS AS NEEDED FOR NAUSEA, Disp: 90 tablet, Rfl: 2    celecoxib (CELEBREX) 200 MG capsule, Take 1 capsule by mouth daily TAKE 1 CAP BY MOUTH DAILY FOR 90 DAYS., Disp: 90 capsule, Rfl: 1    pregabalin (LYRICA) 150 MG capsule, Take 1 capsule by mouth 2 times daily for 30 days. , Disp: 60 capsule, Rfl: 2    FLUoxetine (PROZAC) 40 MG capsule, Take 1 capsule by mouth 2 times daily, Disp: 180 capsule, Rfl: 1    SUMAtriptan (IMITREX) 100 MG tablet, Take 1 tablet by mouth once as needed for Migraine, Disp: 27 tablet, Rfl: 1    FERROUS SULFATE PO, Take 1 tablet by mouth daily. , Disp: , Rfl:     OMEGA-3 FATTY ACIDS-VITAMIN E PO, Take by mouth, Disp: , Rfl:     albuterol sulfate  (90 Base) MCG/ACT inhaler, INHALE 2 PUFFS EVERY 6 (SIX) HOURS AS NEEDED FOR WHEEZING., Disp: , Rfl:     ascorbic acid (VITAMIN C) 500 MG tablet, Take 1 tablet (500 mg) by mouth daily. , Disp: , Rfl:     azaTHIOprine (IMURAN) 50 MG tablet, TAKE 1 TABLET BY MOUTH 2 TIMES A DAY., Disp: , Rfl:     Budesonide ER 9 MG TB24, TAKE 1 TABLET BY MOUTH ONCE DAILY. , Disp: , Rfl:     buPROPion (WELLBUTRIN) 100 MG tablet, Take 100 mg by mouth 2 times daily, Disp: , Rfl:     calcipotriene (DOVONEX) 0.005 % cream, APPLY 2-3 GRAMS TO AFFECTED AREA(S) 3-4 TIMES DAILY. (1 GRAM = 1 DIME SIZE AMOUNT) AS NEEDED, Disp: , Rfl:     Calcium-Vitamin D-Vitamin K 500-100-40 MG-UNT-MCG CHEW, 500 mg., Disp: , Rfl:     vitamin D (CHOLECALCIFEROL) 125 MCG (5000 UT) CAPS capsule, TAKE ONE CAPSULE BY MOUTH EVERY DAY, Disp: , Rfl:     cyanocobalamin 1000 MCG/ML injection, Inject 1 mL (1,000 mcg total) into the shoulder, thigh, or buttocks every 30 (thirty) days. Inject as directed: Dispense 1 multidose vial: Dispense injection syringes with medication.  12 each 21g needle with syringe., Disp: , Rfl:     diclofenac sodium (VOLTAREN) 1 % GEL, APPLY 3 4 GRAMS TO AFFECTED AREA THREE TIMES DAILY AS NEEDED, Disp: , Rfl:     dicyclomine (BENTYL) 10 MG capsule, Take 10 mg by mouth 2 times daily, Disp: , Rfl:     diphenhydrAMINE (SOMINEX) 25 MG tablet, Take 50 mg by mouth, Disp: , Rfl:     doxycycline monohydrate (MONODOX) 100 MG capsule, Take 100 mg by mouth 2 times daily, Disp: , Rfl:     ergocalciferol (ERGOCALCIFEROL) 1.25 MG (01464 UT) capsule, Take 1 capsule (50,000 Units total) by mouth daily. , Disp: , Rfl:     fluticasone (FLONASE) 50 MCG/ACT nasal spray, 1 spray by Each Nare route 2 (two) times a day., Disp: , Rfl:     ipratropium-albuterol (DUONEB) 0.5-2.5 (3) MG/3ML SOLN nebulizer solution, Take 3 mL by nebulization every 6 (six) hours as needed for wheezing., Disp: , Rfl:     levocetirizine (XYZAL) 5 MG tablet, Take 5 mg by mouth daily as needed, Disp: , Rfl:     lidocaine (XYLOCAINE) 5 % ointment, APPLY 2-3 GRAMS TO AFFECTED AREA(S) 3-4 TIMES DAILY. (1 GRAM = 1 DIME SIZE AMOUNT) AS NEEDED, Disp: , Rfl:     loratadine (CLARITIN) 10 MG tablet, TAKE 1 TABLET BY MOUTH EVERY DAY, Disp: , Rfl:     montelukast (SINGULAIR) 10 MG tablet, TAKE 1 TABLET BY MOUTH EVERY DAY, Disp: , Rfl:     ofloxacin (FLOXIN) 0.3 % otic solution, Place 5 drops in ear(s) 2 times daily, Disp: , Rfl:     oxyCODONE HCl (OXY-IR) 10 MG immediate release tablet, TAKE 1 TAB BY MOUTH EVERY 6 HOURS, Disp: , Rfl:     oxyCODONE-acetaminophen (PERCOCET) 5-325 MG per tablet, TAKE 1 TO 2 TABLETS BY MOUTH EVERY 8 HOURS AS NEEDED FOR PAIN, Disp: , Rfl:     pantoprazole (PROTONIX) 40 MG tablet, Take 40 mg by mouth 2 times daily, Disp: , Rfl:     brompheniramine-pseudoephedrine-DM 2-30-10 MG/5ML syrup, Take 5 mLs by mouth 4 times daily as needed, Disp: , Rfl:     triamcinolone acetonide (KENALOG) 0.1 % paste, Apply 0.25 inches to teeth 2 (two) times a day., Disp: , Rfl:     Allergies   Allergen Reactions    Latex Other (See Comments)     02 TUBING TUBING FROM PAIN PUMP    Penicillin G Hives, Other (See Comments) and Rash     WHEEZING    Codeine Itching and Rash     Other reaction(s): Abdominal pain-Intolerance  Due to nausea, has taken since then & did fine, no side effects    Morphine Itching    Adhesive Tape Hives     ADHESIVE TAPE  BLISTERS        Hydrocortisone Other (See Comments)     IV infusion infiltrated and caused blisters. Review of Systems   Constitutional: Negative. HENT: Negative. Respiratory: Negative. Gastrointestinal:  Positive for abdominal pain, blood in stool and diarrhea. Objective: There were no vitals taken for this visit. Examination:  Physical Exam  Neurological:      Mental Status: She is alert. Psychiatric:         Mood and Affect: Mood normal.         Thought Content: Thought content normal.         Judgment: Judgment normal.         Assessment/Plan:    Bret Vaughn was seen today for chronic pain. Diagnoses and all orders for this visit:    Crohn's disease of both small and large intestine without complication ([facility] [address]  She is taking Imuran as prescribed and has f/u w/ GI next month; she was advised to go to ED if worsening symptoms over the weekend;     Gastroesophageal reflux disease without esophagitis  Instructed to take medications as prescribed, and to call if no improvement in symptoms. She is to continue Protonix 40 mg qd, stop Celebrex and avoid NSAIDs otc;     -     dicyclomine (BENTYL) 20 MG tablet; Take 1 tablet by mouth 4 times daily as needed (GERD)    Blood in stool  She has upcoming appointment with GI;       Follow-up and Dispositions    Return if symptoms worsen or fail to improve, for keep f/u next month. Medication Reconciliation:  Current Medications Verified: Current medications/ immunizations were reviewed, including purpose, with patient. Family History, Social History, Current and Past Medical History was reviewed with patient and updated at today's office visit. Medication Reconciliation list was given to patient/ family. Patient was advised to discard old medication lists and provide all providers with current list at each visit and carry list with them in case of emergency.     Completed By:   Wade Chance MD    [facility] [address], [address], [address] [facility]  [phone] fax  4:29 PM

## 2022-10-25 DIAGNOSIS — G43.519 INTRACTABLE PERSISTENT MIGRAINE AURA WITHOUT CEREBRAL INFARCTION AND WITHOUT STATUS MIGRAINOSUS: ICD-10-CM

## 2022-10-25 RX ORDER — SUMATRIPTAN 100 MG/1
100 TABLET, FILM COATED ORAL
Qty: 27 TABLET | Refills: 1 | OUTPATIENT
Start: 2022-10-25 | End: 2022-10-25

## 2022-10-28 ENCOUNTER — TELEMEDICINE (OUTPATIENT)
Dept: INTERNAL MEDICINE CLINIC | Facility: CLINIC | Age: 62
End: 2022-10-28
Payer: COMMERCIAL

## 2022-10-28 DIAGNOSIS — J00 ACUTE NASOPHARYNGITIS: Primary | ICD-10-CM

## 2022-10-28 DIAGNOSIS — G43.519 INTRACTABLE PERSISTENT MIGRAINE AURA WITHOUT CEREBRAL INFARCTION AND WITHOUT STATUS MIGRAINOSUS: ICD-10-CM

## 2022-10-28 PROCEDURE — 99213 OFFICE O/P EST LOW 20 MIN: CPT | Performed by: INTERNAL MEDICINE

## 2022-10-28 RX ORDER — AZITHROMYCIN 250 MG/1
250 TABLET, FILM COATED ORAL SEE ADMIN INSTRUCTIONS
Qty: 6 TABLET | Refills: 0 | Status: SHIPPED | OUTPATIENT
Start: 2022-10-28 | End: 2022-11-02

## 2022-10-28 RX ORDER — METHYLPREDNISOLONE 4 MG/1
TABLET ORAL
Qty: 1 KIT | Refills: 0 | Status: SHIPPED | OUTPATIENT
Start: 2022-10-28 | End: 2022-11-03

## 2022-10-28 RX ORDER — SUMATRIPTAN 100 MG/1
100 TABLET, FILM COATED ORAL
Qty: 27 TABLET | Refills: 1 | Status: SHIPPED | OUTPATIENT
Start: 2022-10-28 | End: 2022-10-28

## 2022-10-28 ASSESSMENT — ENCOUNTER SYMPTOMS
SINUS PRESSURE: 1
RHINORRHEA: 1
RESPIRATORY NEGATIVE: 1

## 2022-10-28 NOTE — PROGRESS NOTES
[facility]      10/28/2022    Patient Name: Maris Gayle  :  1960    Subjective:    Chief Complaint:  Chief Complaint   Patient presents with    Cough         HPI I was at home while conducting this encounter. Consent:  She and/or her healthcare decision maker is aware that this patient-initiated Telehealth encounter is a billable service, with coverage as determined by her insurance carrier. She is aware that she may receive a bill and has provided verbal consent to proceed: Yes    This virtual visit was conducted via [employer]. Pursuant to the emergency declaration under the [facility], [facility] waiver authority and the [employer] and [employer] Act, this Virtual  Visit was conducted to reduce the patient's risk of exposure to COVID-19 and provide continuity of care for an established patient. Services were provided through a video synchronous discussion virtually to substitute for in-person clinic visit. Due to this being a TeleHealth evaluation, many elements of the physical examination are unable to be assessed. Total Time: minutes: 5-10 minutes.        C/o infected tooth and has upcomming appointment with oral surgeon; c/o congestion in her ears and sinuses; she felt feverish a couple of nights ago; she's had yellow green drainage; she denies SOB, wheezing;     Past Medical History:   Diagnosis Date    Anxiety     Crohn's disease ([facility] [address]     Depression     H/O seasonal allergies     History of bone density study 2018    osteoporosis    History of mammogram 2018    HTN (hypertension)     Insomnia     Liver cirrhosis secondary to CASTELLANOS (nonalcoholic steatohepatitis) [facility])        Past Surgical History:   Procedure Laterality Date    APPENDECTOMY      BACK SURGERY      CHOLECYSTECTOMY      COLONOSCOPY  2017    Dr. Simeon Alt      DEEP AND BSO (CERVIX REMOVED)      TUBAL LIGATION Family History   Problem Relation Age of Onset    Breast Cancer Mother        Social History     Tobacco Use    Smoking status: Former     Packs/day: 2.00     Types: Cigarettes     Quit date: 1997     Years since quittin.8    Smokeless tobacco: Never   Substance Use Topics    Alcohol use: No    Drug use: No                 Current Outpatient Medications:     SUMAtriptan (IMITREX) 100 MG tablet, Take 1 tablet by mouth once as needed for Migraine, Disp: 27 tablet, Rfl: 1    azithromycin (ZITHROMAX) 250 MG tablet, Take 1 tablet by mouth See Admin Instructions for 5 days 500mg on day 1 followed by 250mg on days 2 - 5, Disp: 6 tablet, Rfl: 0    methylPREDNISolone (MEDROL DOSEPACK) 4 MG tablet, Take by mouth., Disp: 1 kit, Rfl: 0    dicyclomine (BENTYL) 20 MG tablet, Take 1 tablet by mouth 4 times daily as needed (GERD), Disp: 60 tablet, Rfl: 1    ALPRAZolam (XANAX) 2 MG tablet, Take 1 tablet by mouth 3 times daily as needed for Anxiety for up to 90 days. , Disp: 90 tablet, Rfl: 2    propranolol (INDERAL) 10 MG tablet, TAKE 1 TAB BY MOUTH THREE (3) TIMES DAILY FOR 90 DAYS., Disp: 270 tablet, Rfl: 1    potassium chloride (KLOR-CON M) 20 MEQ extended release tablet, Take 1 tablet by mouth daily, Disp: 90 tablet, Rfl: 1    DULoxetine (CYMBALTA) 20 MG extended release capsule, Take 1 capsule by mouth daily, Disp: 90 capsule, Rfl: 1    Misc.  Devices KIT, [employer] perfect sleep chair, chronic back pain, Disp: 1 kit, Rfl: 0    methocarbamol (ROBAXIN) 500 MG tablet, TAKE 1/2 TO 1 TAB 3 TIMES DAILY AS NEEDED FOR PAIN, Disp: , Rfl:     estradiol (ESTRACE) 0.1 MG/GM vaginal cream, APPLY HALF A GRAM INTO THE VAGINA NIGHTLY FOR 2 WEEKS THEN TWICE WEEK, Disp: , Rfl:     traZODone (DESYREL) 50 MG tablet, Take 2 tablets by mouth nightly, Disp: 60 tablet, Rfl: 5    ondansetron (ZOFRAN-ODT) 8 MG TBDP disintegrating tablet, Place 1 tablet under the tongue every 8 hours as needed for Nausea or Vomiting TAKE 1 TAB BY MOUTH EVERY EIGHT (8) HOURS AS NEEDED FOR NAUSEA, Disp: 90 tablet, Rfl: 2    celecoxib (CELEBREX) 200 MG capsule, Take 1 capsule by mouth daily TAKE 1 CAP BY MOUTH DAILY FOR 90 DAYS., Disp: 90 capsule, Rfl: 1    pregabalin (LYRICA) 150 MG capsule, Take 1 capsule by mouth 2 times daily for 30 days. , Disp: 60 capsule, Rfl: 2    FLUoxetine (PROZAC) 40 MG capsule, Take 1 capsule by mouth 2 times daily, Disp: 180 capsule, Rfl: 1    FERROUS SULFATE PO, Take 1 tablet by mouth daily. , Disp: , Rfl:     OMEGA-3 FATTY ACIDS-VITAMIN E PO, Take by mouth, Disp: , Rfl:     albuterol sulfate  (90 Base) MCG/ACT inhaler, INHALE 2 PUFFS EVERY 6 (SIX) HOURS AS NEEDED FOR WHEEZING., Disp: , Rfl:     ascorbic acid (VITAMIN C) 500 MG tablet, Take 1 tablet (500 mg) by mouth daily. , Disp: , Rfl:     azaTHIOprine (IMURAN) 50 MG tablet, TAKE 1 TABLET BY MOUTH 2 TIMES A DAY., Disp: , Rfl:     Budesonide ER 9 MG TB24, TAKE 1 TABLET BY MOUTH ONCE DAILY. , Disp: , Rfl:     buPROPion (WELLBUTRIN) 100 MG tablet, Take 100 mg by mouth 2 times daily, Disp: , Rfl:     calcipotriene (DOVONEX) 0.005 % cream, APPLY 2-3 GRAMS TO AFFECTED AREA(S) 3-4 TIMES DAILY. (1 GRAM = 1 DIME SIZE AMOUNT) AS NEEDED, Disp: , Rfl:     Calcium-Vitamin D-Vitamin K 500-100-40 MG-UNT-MCG CHEW, 500 mg., Disp: , Rfl:     vitamin D (CHOLECALCIFEROL) 125 MCG (5000 UT) CAPS capsule, TAKE ONE CAPSULE BY MOUTH EVERY DAY, Disp: , Rfl:     cyanocobalamin 1000 MCG/ML injection, Inject 1 mL (1,000 mcg total) into the shoulder, thigh, or buttocks every 30 (thirty) days. Inject as directed: Dispense 1 multidose vial: Dispense injection syringes with medication.  12 each 21g needle with syringe., Disp: , Rfl:     diclofenac sodium (VOLTAREN) 1 % GEL, APPLY 3 4 GRAMS TO AFFECTED AREA THREE TIMES DAILY AS NEEDED, Disp: , Rfl:     dicyclomine (BENTYL) 10 MG capsule, Take 10 mg by mouth 2 times daily, Disp: , Rfl:     diphenhydrAMINE (SOMINEX) 25 MG tablet, Take 50 mg by mouth, Disp: , Rfl: doxycycline monohydrate (MONODOX) 100 MG capsule, Take 100 mg by mouth 2 times daily, Disp: , Rfl:     ergocalciferol (ERGOCALCIFEROL) 1.25 MG (96995 UT) capsule, Take 1 capsule (50,000 Units total) by mouth daily. , Disp: , Rfl:     fluticasone (FLONASE) 50 MCG/ACT nasal spray, 1 spray by Each Nare route 2 (two) times a day., Disp: , Rfl:     ipratropium-albuterol (DUONEB) 0.5-2.5 (3) MG/3ML SOLN nebulizer solution, Take 3 mL by nebulization every 6 (six) hours as needed for wheezing., Disp: , Rfl:     levocetirizine (XYZAL) 5 MG tablet, Take 5 mg by mouth daily as needed, Disp: , Rfl:     lidocaine (XYLOCAINE) 5 % ointment, APPLY 2-3 GRAMS TO AFFECTED AREA(S) 3-4 TIMES DAILY. (1 GRAM = 1 DIME SIZE AMOUNT) AS NEEDED, Disp: , Rfl:     loratadine (CLARITIN) 10 MG tablet, TAKE 1 TABLET BY MOUTH EVERY DAY, Disp: , Rfl:     montelukast (SINGULAIR) 10 MG tablet, TAKE 1 TABLET BY MOUTH EVERY DAY, Disp: , Rfl:     ofloxacin (FLOXIN) 0.3 % otic solution, Place 5 drops in ear(s) 2 times daily, Disp: , Rfl:     oxyCODONE HCl (OXY-IR) 10 MG immediate release tablet, TAKE 1 TAB BY MOUTH EVERY 6 HOURS, Disp: , Rfl:     oxyCODONE-acetaminophen (PERCOCET) 5-325 MG per tablet, TAKE 1 TO 2 TABLETS BY MOUTH EVERY 8 HOURS AS NEEDED FOR PAIN, Disp: , Rfl:     pantoprazole (PROTONIX) 40 MG tablet, Take 40 mg by mouth 2 times daily, Disp: , Rfl:     brompheniramine-pseudoephedrine-DM 2-30-10 MG/5ML syrup, Take 5 mLs by mouth 4 times daily as needed, Disp: , Rfl:     triamcinolone acetonide (KENALOG) 0.1 % paste, Apply 0.25 inches to teeth 2 (two) times a day., Disp: , Rfl:     Allergies   Allergen Reactions    Latex Other (See Comments)     02 TUBING TUBING FROM PAIN PUMP    Penicillin G Hives, Other (See Comments) and Rash     WHEEZING    Codeine Itching and Rash     Other reaction(s): Abdominal pain-Intolerance  Due to nausea, has taken since then & did fine, no side effects    Morphine Itching    Adhesive Tape Hives     ADHESIVE TAPE  BLISTERS        Hydrocortisone Other (See Comments)     IV infusion infiltrated and caused blisters. Review of Systems   Constitutional:  Positive for chills and fever. HENT:  Positive for congestion, postnasal drip, rhinorrhea and sinus pressure. Respiratory: Negative. Cardiovascular: Negative. Objective: There were no vitals taken for this visit. Examination:  Physical Exam  Neurological:      Mental Status: She is alert. Psychiatric:         Mood and Affect: Mood normal.         Thought Content: Thought content normal.         Judgment: Judgment normal.         Assessment/Plan:    Libby Caal was seen today for cough. Diagnoses and all orders for this visit:    Acute nasopharyngitis  Instructed to take medications as prescribed, and to call if no improvement in symptoms. 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     -     SUMAtriptan (IMITREX) 100 MG tablet; Take 1 tablet by mouth once as needed for Migraine  -     methylPREDNISolone (MEDROL DOSEPACK) 4 MG tablet; Take by mouth. Follow-up and Dispositions    Return if symptoms worsen or fail to improve. Medication Reconciliation:  Current Medications Verified: Current medications/ immunizations were reviewed, including purpose, with patient. Family History, Social History, Current and Past Medical History was reviewed with patient and updated at today's office visit. Medication Reconciliation list was given to patient/ family. Patient was advised to discard old medication lists and provide all providers with current list at each visit and carry list with them in case of emergency.     Completed By:   Loni Will MD    [facility]  [address], [address] [facility]  [address], [address] [facility]  [phone] fax  4:32 PM

## 2022-10-31 DIAGNOSIS — M51.36 DDD (DEGENERATIVE DISC DISEASE), LUMBAR: ICD-10-CM

## 2022-10-31 RX ORDER — PREGABALIN 150 MG/1
150 CAPSULE ORAL 2 TIMES DAILY
Qty: 60 CAPSULE | Refills: 2 | OUTPATIENT
Start: 2022-10-31 | End: 2022-11-30

## 2022-11-14 DIAGNOSIS — M51.36 DDD (DEGENERATIVE DISC DISEASE), LUMBAR: ICD-10-CM

## 2022-11-15 RX ORDER — DICYCLOMINE HYDROCHLORIDE 10 MG/1
CAPSULE ORAL
Qty: 180 CAPSULE | Refills: 1 | OUTPATIENT
Start: 2022-11-15

## 2022-11-15 RX ORDER — POTASSIUM CHLORIDE 1500 MG/1
TABLET, FILM COATED, EXTENDED RELEASE ORAL
Qty: 90 TABLET | Refills: 1 | OUTPATIENT
Start: 2022-11-15

## 2022-11-15 RX ORDER — CELECOXIB 200 MG/1
CAPSULE ORAL
Qty: 90 CAPSULE | Refills: 1 | OUTPATIENT
Start: 2022-11-15

## 2022-11-18 ENCOUNTER — TELEPHONE (OUTPATIENT)
Dept: INTERNAL MEDICINE CLINIC | Facility: CLINIC | Age: 62
End: 2022-11-18

## 2022-11-21 ENCOUNTER — TELEPHONE (OUTPATIENT)
Dept: INTERNAL MEDICINE CLINIC | Facility: CLINIC | Age: 62
End: 2022-11-21

## 2022-11-21 NOTE — TELEPHONE ENCOUNTER
----- Message from Analy Troncoso sent at 11/21/2022  8:41 AM EST -----  Subject: Message to Provider    QUESTIONS  Information for Provider? Son of the patient called to inform the provider   that the patient passed away on 11/18.  ---------------------------------------------------------------------------  --------------  CALL BACK INFO  [phone]; OK to leave message on voicemail  ---------------------------------------------------------------------------  --------------  SCRIPT ANSWERS  Relationship to Patient? Other  Representative Name? Marshall Crump  Is the [facility] on the appropriate HIPAA document in Epic?  Yes

## 2022-11-22 NOTE — TELEPHONE ENCOUNTER
Talked to pt's son, Corinne Krebs, and he stated pt was found in the floor of her home. Does not know what happened.